# Patient Record
Sex: MALE | Race: WHITE | NOT HISPANIC OR LATINO | Employment: FULL TIME | URBAN - METROPOLITAN AREA
[De-identification: names, ages, dates, MRNs, and addresses within clinical notes are randomized per-mention and may not be internally consistent; named-entity substitution may affect disease eponyms.]

---

## 2023-05-04 DIAGNOSIS — I10 BENIGN ESSENTIAL HYPERTENSION: Primary | ICD-10-CM

## 2023-05-04 RX ORDER — AMLODIPINE BESYLATE 5 MG/1
5 TABLET ORAL DAILY
COMMUNITY
End: 2023-05-04 | Stop reason: SDUPTHER

## 2023-05-04 RX ORDER — AMLODIPINE BESYLATE 5 MG/1
5 TABLET ORAL DAILY
Qty: 90 TABLET | Refills: 0 | Status: SHIPPED | OUTPATIENT
Start: 2023-05-04 | End: 2023-08-09 | Stop reason: SDUPTHER

## 2023-07-03 DIAGNOSIS — I10 BENIGN ESSENTIAL HYPERTENSION: Primary | ICD-10-CM

## 2023-07-03 RX ORDER — LOSARTAN POTASSIUM AND HYDROCHLOROTHIAZIDE 12.5; 5 MG/1; MG/1
2 TABLET ORAL DAILY
COMMUNITY
Start: 2020-06-08 | End: 2023-07-03 | Stop reason: SDUPTHER

## 2023-07-04 RX ORDER — LOSARTAN POTASSIUM AND HYDROCHLOROTHIAZIDE 12.5; 5 MG/1; MG/1
2 TABLET ORAL DAILY
Qty: 180 TABLET | Refills: 0 | Status: SHIPPED | OUTPATIENT
Start: 2023-07-04 | End: 2023-07-04

## 2023-08-09 DIAGNOSIS — I10 BENIGN ESSENTIAL HYPERTENSION: ICD-10-CM

## 2023-08-11 RX ORDER — AMLODIPINE BESYLATE 5 MG/1
5 TABLET ORAL DAILY
Qty: 90 TABLET | Refills: 0 | Status: SHIPPED | OUTPATIENT
Start: 2023-08-11 | End: 2023-08-11

## 2023-10-07 DIAGNOSIS — I10 BENIGN ESSENTIAL HYPERTENSION: ICD-10-CM

## 2023-10-10 RX ORDER — LOSARTAN POTASSIUM AND HYDROCHLOROTHIAZIDE 12.5; 5 MG/1; MG/1
2 TABLET ORAL DAILY
Qty: 180 TABLET | Refills: 0 | Status: SHIPPED | OUTPATIENT
Start: 2023-10-10 | End: 2024-01-10 | Stop reason: SDUPTHER

## 2023-10-11 ENCOUNTER — PHARMACY VISIT (OUTPATIENT)
Dept: PHARMACY | Facility: CLINIC | Age: 53
End: 2023-10-11
Payer: COMMERCIAL

## 2023-10-11 PROCEDURE — RXMED WILLOW AMBULATORY MEDICATION CHARGE

## 2023-10-13 DIAGNOSIS — I10 BENIGN ESSENTIAL HYPERTENSION: ICD-10-CM

## 2023-10-17 ENCOUNTER — PHARMACY VISIT (OUTPATIENT)
Dept: PHARMACY | Facility: CLINIC | Age: 53
End: 2023-10-17
Payer: COMMERCIAL

## 2023-10-17 RX ORDER — METHYLPHENIDATE HYDROCHLORIDE 18 MG/1
36 TABLET ORAL DAILY
Qty: 180 TABLET | Refills: 0 | OUTPATIENT
Start: 2023-10-17 | End: 2023-10-19

## 2023-10-18 ENCOUNTER — PHARMACY VISIT (OUTPATIENT)
Dept: PHARMACY | Facility: CLINIC | Age: 53
End: 2023-10-18
Payer: COMMERCIAL

## 2023-10-18 PROCEDURE — RXMED WILLOW AMBULATORY MEDICATION CHARGE

## 2023-10-18 RX ORDER — AMLODIPINE BESYLATE 5 MG/1
5 TABLET ORAL DAILY
Qty: 90 TABLET | Refills: 0 | Status: SHIPPED | OUTPATIENT
Start: 2023-10-18 | End: 2024-01-10 | Stop reason: SDUPTHER

## 2023-10-18 RX ORDER — ATOMOXETINE 10 MG/1
20 CAPSULE ORAL DAILY
Qty: 90 CAPSULE | Refills: 0 | OUTPATIENT
Start: 2023-10-18 | End: 2023-12-15 | Stop reason: SDUPTHER

## 2023-10-20 ENCOUNTER — PHARMACY VISIT (OUTPATIENT)
Dept: PHARMACY | Facility: CLINIC | Age: 53
End: 2023-10-20
Payer: COMMERCIAL

## 2023-10-20 PROCEDURE — RXMED WILLOW AMBULATORY MEDICATION CHARGE

## 2023-10-20 RX ORDER — METHYLPHENIDATE HYDROCHLORIDE 36 MG/1
36 TABLET ORAL DAILY
Qty: 60 TABLET | Refills: 0 | OUTPATIENT
Start: 2023-10-19

## 2023-12-15 PROCEDURE — RXMED WILLOW AMBULATORY MEDICATION CHARGE

## 2023-12-16 ENCOUNTER — PHARMACY VISIT (OUTPATIENT)
Dept: PHARMACY | Facility: CLINIC | Age: 53
End: 2023-12-16
Payer: COMMERCIAL

## 2024-01-10 DIAGNOSIS — I10 BENIGN ESSENTIAL HYPERTENSION: ICD-10-CM

## 2024-01-10 PROCEDURE — RXMED WILLOW AMBULATORY MEDICATION CHARGE

## 2024-01-11 PROCEDURE — RXMED WILLOW AMBULATORY MEDICATION CHARGE

## 2024-01-11 RX ORDER — LOSARTAN POTASSIUM AND HYDROCHLOROTHIAZIDE 12.5; 5 MG/1; MG/1
2 TABLET ORAL DAILY
Qty: 180 TABLET | Refills: 0 | Status: SHIPPED | OUTPATIENT
Start: 2024-01-11 | End: 2024-03-23 | Stop reason: SDUPTHER

## 2024-01-11 RX ORDER — AMLODIPINE BESYLATE 5 MG/1
5 TABLET ORAL DAILY
Qty: 90 TABLET | Refills: 0 | Status: SHIPPED | OUTPATIENT
Start: 2024-01-11 | End: 2024-03-23 | Stop reason: SDUPTHER

## 2024-01-13 ENCOUNTER — PHARMACY VISIT (OUTPATIENT)
Dept: PHARMACY | Facility: CLINIC | Age: 54
End: 2024-01-13
Payer: COMMERCIAL

## 2024-01-24 DIAGNOSIS — Z00.00 HEALTH CARE MAINTENANCE: ICD-10-CM

## 2024-01-25 RX ORDER — MONTELUKAST SODIUM 10 MG/1
10 TABLET ORAL NIGHTLY
Qty: 90 TABLET | Refills: 0 | Status: SHIPPED | OUTPATIENT
Start: 2024-01-25 | End: 2024-04-25

## 2024-01-31 ENCOUNTER — SPECIALTY PHARMACY (OUTPATIENT)
Dept: PHARMACY | Facility: CLINIC | Age: 54
End: 2024-01-31

## 2024-02-15 PROCEDURE — RXMED WILLOW AMBULATORY MEDICATION CHARGE

## 2024-02-16 ENCOUNTER — PHARMACY VISIT (OUTPATIENT)
Dept: PHARMACY | Facility: CLINIC | Age: 54
End: 2024-02-16
Payer: COMMERCIAL

## 2024-02-27 ENCOUNTER — OFFICE VISIT (OUTPATIENT)
Dept: SLEEP MEDICINE | Facility: HOSPITAL | Age: 54
End: 2024-02-27
Payer: COMMERCIAL

## 2024-02-27 VITALS
TEMPERATURE: 97.7 F | BODY MASS INDEX: 45.03 KG/M2 | DIASTOLIC BLOOD PRESSURE: 89 MMHG | WEIGHT: 315 LBS | HEART RATE: 80 BPM | OXYGEN SATURATION: 93 % | SYSTOLIC BLOOD PRESSURE: 129 MMHG

## 2024-02-27 DIAGNOSIS — E66.01 CLASS 3 SEVERE OBESITY DUE TO EXCESS CALORIES WITH SERIOUS COMORBIDITY AND BODY MASS INDEX (BMI) OF 40.0 TO 44.9 IN ADULT (MULTI): ICD-10-CM

## 2024-02-27 DIAGNOSIS — G47.33 OSA (OBSTRUCTIVE SLEEP APNEA): Primary | ICD-10-CM

## 2024-02-27 PROCEDURE — 99214 OFFICE O/P EST MOD 30 MIN: CPT | Performed by: INTERNAL MEDICINE

## 2024-02-27 PROCEDURE — 1036F TOBACCO NON-USER: CPT | Performed by: INTERNAL MEDICINE

## 2024-02-27 SDOH — ECONOMIC STABILITY: FOOD INSECURITY: WITHIN THE PAST 12 MONTHS, THE FOOD YOU BOUGHT JUST DIDN'T LAST AND YOU DIDN'T HAVE MONEY TO GET MORE.: NEVER TRUE

## 2024-02-27 SDOH — ECONOMIC STABILITY: FOOD INSECURITY: WITHIN THE PAST 12 MONTHS, YOU WORRIED THAT YOUR FOOD WOULD RUN OUT BEFORE YOU GOT MONEY TO BUY MORE.: NEVER TRUE

## 2024-02-27 ASSESSMENT — LIFESTYLE VARIABLES: HOW OFTEN DO YOU HAVE A DRINK CONTAINING ALCOHOL: MONTHLY OR LESS

## 2024-02-27 ASSESSMENT — PATIENT HEALTH QUESTIONNAIRE - PHQ9
2. FEELING DOWN, DEPRESSED OR HOPELESS: NOT AT ALL
1. LITTLE INTEREST OR PLEASURE IN DOING THINGS: NOT AT ALL
SUM OF ALL RESPONSES TO PHQ9 QUESTIONS 1 & 2: 0

## 2024-02-27 ASSESSMENT — PAIN SCALES - GENERAL: PAINLEVEL: 0-NO PAIN

## 2024-02-27 NOTE — PROGRESS NOTES
Patient: Tevin Costello    [id number]  : 1970 -- AGE 53 y.o.    Provider: Abdiel Butler MD PhD     Location [facility]   Service Date: 2024              [facility]  Followup Visit Note      HISTORY OF PRESENT ILLNESS     The patient's referring provider is: No ref. provider found    HISTORY OF PRESENT ILLNESS   Tevin Costello is a 53 y.o. male with past medical history significant for ANABEL, HTN, HLD, ADHD, glucose intolerante, hx of COVID in 2021 with hospitalization who presents to a [facility] for followup.       PAST SLEEP HISTORY  He had a sleep study on 2018 which showed an AHI of 50 events/hour with an SPO2 odin of 68%. He had a CPAP titration on 2018 at a BMI of 40 which showed a residual AHI of 1.8 events/hour auto CPAP of 13 but with elevated PLM index of 38 events/hour. He has been on APAP 8-14. He had a pulse ox study on CPAP in 3/2021 with no significant residual hypoxemia. He is using oxygen at 4lpm with his APAP. He has been a good user of CPAP his DME is [employer]. He has been using a full facemask with a CPAP device. He has a Dreamstation since 2018, but now is on a Resmed AirSense 11.  He uses magneisum, calcium, vit D3 and sleeps 6-8 per night.      He had a nasal surgery in the past. He is on Concerta 36 for ADD but also helps him stay awake.     CURRENT HISTORY  At time of his last visit we ordered a new PAP device . We stopped his oxygen as he no longer needed it.    On today's visit, the patient reports he is using his CPAP nightly. On CPAP he does not snore, gasp, choke. He continues to use a magnesium which helps his sleep. He has transitioned to Strattera for his ADD. Concerta was tending to keep him more awake at end of day.    Sleep schedule:  In bed:1AM (sometimes 2AM)  Activities in bed: no  Lights out: < 15 min  Subjective sleep latency:  no  Awakenings during night: wake  Length of awakenings: 15minutes  Final awakening time: 9AM  Out of bed: 9AM  Overall estimate of total sleep time:      On weekends: similar    Preferred sleeping position: supine and sidelying  Typically sleeps with his wife.       PAP Adherence:  DURABLE MEDICAL EQUIPMENT COMPANY: [employer]  Machine: THERAPY: RESMED AIRSENSE 11 PRESSURE SETTINGS: 5 - 15 cm H2O  Mask: MASK TYPE: FULL FACE MASK - F&P Simplus - medium.  Issues with therapy: ISSUES WITH THERAPY: none  Benefits with PAP: PERCEIVED BENEFITS OF PAP: refreshing sleep reduced daytime sleepiness    Daytime Symptoms  On awakening patient reports: waking refreshed    Daytime: During the day, he does not doze unintentionally while inactive.  During more active tasks, he rarely is drowsy.  With regards to daytime napping, the patient reports rarely taking naps. If he takes a nap, typically he awakens refreshed.  He does not report that poor sleep results in mood-related irritability. He does not report that poor sleep results in issues with memory/concentration.    ESS: 0  QING: 1  FOSQ: 40      REVIEW OF SYSTEMS     REVIEW OF SYSTEMS  Review of Systems   All other systems reviewed and are negative.      ALLERGIES AND MEDICATIONS     ALLERGIES  Allergies   Allergen Reactions    Aspirin Swelling    Penicillin Swelling       MEDICATIONS: He has a current medication list which includes the following prescription(s): amlodipine - TAKE 1 TABLET BY MOUTH ONCE DAILY, atomoxetine - Take 2 capsules by mouth daily, dupixent pen - 1(one) application(s) subcutaneous 2(two) times a, dupixent pen - 1(one) application(s) subcutaneous 2(two) times a, dupixent pen - 1(one) application(s) subcutaneous 2(two) times a, losartan-hydrochlorothiazide - TAKE 2 TABLETS BY MOUTH ONCE DAILY, montelukast - TAKE 1 TABLET BY MOUTH EVERYDAY AT BEDTIME, methylphenidate er - Take 1 tablet by mouth daily (Patient not taking: Reported on  2/27/2024), and methylphenidate er - TAKE 2 TABLETS BY MOUTH ONCE DAILY.    PAST MEDICAL HISTORY : He does not have a problem list on file.    PAST SURGICAL HISTORY: He  has a past surgical history that includes Tonsillectomy (03/15/2016) and Tonsillectomy (09/15/2017).     FAMILY HISTORY: No changes since previous visit. Otherwise non-contributory as charted.     SOCIAL HISTORY  He  reports that he has never smoked. He has never used smokeless tobacco. He reports current alcohol use. He reports that he does not use drugs. .      PHYSICAL EXAM     Physical Examination: /89 (BP Location: Right arm, Patient Position: Sitting)   Pulse 80   Temp 36.5 °C (97.7 °F) (Temporal)   Wt (!) 151 kg (332 lb)   SpO2 93% Comment: RA  BMI 45.03 kg/m²     PREVIOUS WEIGHTS:  Wt Readings from Last 3 Encounters:   02/27/24 (!) 151 kg (332 lb)   01/25/23 (!) 153 kg (336 lb 8 oz)   10/27/22 (!) 152 kg (335 lb 8 oz)       General: The patient is a pleasant male, in no acute distress. HEENT: He has a  a modified Mallampati grade 3 airway with Numbers; 0-4 (with +): No tonsils bilaterally. The soft palate was normal and the uvula was thin short. The A/P diameter of the velopharynx was not narrowed. The oropharynx was not shallow in the A/P diameter. Lateral wall narrowing was not present. Tongue ridging waspresent. Erythema of the posterior pharynx was not present. Mucosal hypertrophy in the posterior oropharynx was not present. Retrognathia was not and micrognathia was not present. Neck: The neck was not enlarged. No JVP, bruits or lymphadenopathy was appreciated. Chest: Clear to auscultation. No wheezes, rales, or rhonchi. Cardiovascular: Regular rate and rhythm. No murmurs, gallops, or clicks. Abdomen: Soft, nontender, nondistended. Positive bowel sounds. Extremities: No clubbing, cyanosis, or edema is noted. Neurologic exam: Alert, oriented x3 and was grossly non-focal.      RESULTS/DATA     Ferritin (ug/L)  "  Date Value   10/25/2018 216       Bicarbonate (mmol/L)   Date Value   01/28/2022 29   01/15/2021 26   01/14/2021 26       PAP Adherence  A PAP adherence download was obtained and data was reviewed personally today in clinic. (see scanned document in EPIC)      DIAGNOSES     Problem List and Orders  Diagnoses and all orders for this visit:  ANABEL (obstructive sleep apnea)  -     Positive Airway Pressure (PAP) Therapy  -     Follow Up In Adult Sleep Medicine; Future        ASSESSMENT/PLAN     Mr. Costello is a 53 y.o. male and with past medical history significant for ANABEL, HTN, HLD, ADHD, glucose intolerante, hx of COVID in January 2021 with hospitalization. He returns in followup to  the [facility] for their ANABEL.    ANABEL on PAP: Overall, Mr. Costello is doing well with the use of PAP therapy for his ANABEL. Compared to before starting PAP, he continues to use and benefit from use of the PAP device and has observed improvements in sleep quality and daytime function. Specifically, his residual daytime sleepiness or fatigue has significantly improved. Thus, continued use of PAP was recommended and to use for the entire sleep period. On PAP, he doesn't snore, have witnessed apneas, gasp for air, or kick/thrash. Objective compliance data also suggest excellent efficacy in the home setting. At a minimum he should use PAP for at least 4 hours/night, however, \"all night, every night\" is best. In addition, PAP use during naps are also encouraged. Mr. Costello was advised to contact him DME to obtain replenishment supplies for him PAP every 3 months or as needed or for other equipment specific issues.    Obesity.  The patient was counseled that his weight is the strongest modifiable risk factor and contributor for ANABEL. He was counseled to consider weight loss options to include changes in dietary habits and activity. We briefly discussed the use of calorie tracking smartphone apps and the use of " activity meters to provide feedback that helps in losing weight.  Before initiating an exercise plan, he should carefully review the approach with his primary care provider.     ADD. Is not on Straterra and weaning the Concerta with goal to stop it. This seems to help his alterness as well.    Follow up: 12 months         Abdiel Butler MD PhD

## 2024-02-27 NOTE — PATIENT INSTRUCTIONS
[facility]  [facility] [place]  [address]  [facility] [address]  [phone]    [facility] [place]  [address]  [facility] [address]  [phone]  [facility] [place]  [address]  [facility] 6TH FLOOR  [facility] [address]           NAME: Tevin Costello   DATE: 2/27/2024     Your Sleep Provider Today: Abdiel Butler MD PhD  Your Primary Care Physician: Morales Clinton MD   Your Referring Provider: No ref. provider found    Thank you for coming to the Sleep Medicine Clinic today! Your sleep medicine provider today was: Abdiel Butler MD PhD Below is a summary of your treatment plan, other important information, and our contact numbers:  If you need to schedule an appointment, please call [phone])  If you need general assistance (e.g. forms completed, general questions), please call my , Jen, at [phone].  If you have a medical question about your sleep issues, please contact our nurses, Jacki or Jerri at [phone].   You can also contact us through [employer].      DIAGNOSIS:   No diagnosis found.        TREATMENT PLAN       Instructions - Common ANABEL Recs: - For your sleep apnea, continue to use your PAP every night and use it whenever you are sleeping.   - Avoid alcohol or sedatives several hours prior to sleeping.   - Get additional supplies for your PAP (e.g., mask, hose, filters) every 3 months or as your insurance allows from your [employer] company. Replacement cushions for your PAP mask can be requested monthly if airseals are an issue.  - Remember to clean your mask, tubings, and water chamber regularly as instructed.  - Avoid driving or operating heavy machinery when drowsy. A person driving while sleepy is five (5) times more likely to have an accident. If you feel sleepy, pull over and take a short power nap (sleep  for less than 30 minutes). Otherwise, ask somebody to drive you.      Follow-up Appointment:   Followup with me in 12 months.      IMPORTANT INFORMATION     Call 911 for medical emergencies.  Our offices are generally open from Monday-Friday, 9 am - 5 pm.  If you need to get in touch with me, you may either call me and my team(number is below) or you can use [employer].  If a referral for a test, for CPAP, or for another specialist was made, and you have not heard about scheduling this within a week, please call scheduling at [phone]).  If you are unable to make your appointment for clinic or an overnight study, kindly call the office at least 48 hours in advance to cancel and reschedule.  If you are on CPAP, please bring your device's card or the device to each clinic appointment.   There are no supporting services by either the sleep doctors or their staff on weekends and Holidays, or after 5 PM on weekdays.   If you have been asked to come to a sleep study, make sure you bring toiletries, a comfy pillow, and any nighttime medications that you may regularly take. Also be sure to eat dinner before you arrive. We generally do not provide meals.      PRESCRIPTIONS     We require 7 days advanced notice for prescription refills. If we do not receive the request in this time, we cannot guarantee that your medication will be refilled in time.      IMPORTANT PHONE NUMBERS      scheduling for medical testin656 [phone]   Sleep Medicine Clinic Fax: [phone]  Appointments (for Pediatric Sleep Clinic): [phone] - option 1  Appointments (for Adult Sleep Clinic): [phone] - option 2  Appointments (For Sleep Studies): [phone] - option 3  Behavioral Sleep Medicine: [phone]  Bariatric Surgery: [phone] ( Bariatric Surgery Website)   Sleep Surgery: [phone]  ENT (Otolaryngology): [phone]  Headache Clinic (Neurology): [phone]  Neurology:  [phone]  Psychiatry: [phone]  Pulmonary Function Testing (PFT) Center: [phone]  Pulmonary Medicine: [phone]  [employer] (DME): [phone]  [employer] (DME): [phone]  [facility] (DME): [phone]      COMMON PROVIDERS WE REFER TO     For Weight Loss - Dr. Shari Cam - Call [phone]  For Sleep Surgery - Dr. Wilian Jones - Call [phone]      OUR ADULT SLEEP MEDICINE TEAM   Please do not hesitate to call the office or sleep nurse with any questions between appointments:    Adult Sleep Nurses (Jerri Garibay, RN and Jacki Briggs RN):  For clinical questions and refilling prescriptions: [phone]  Email sleep diaries and other documents at: [email or web address]    Adult Sleep Medicine Secretaries:  Zina Araiza (For Calixto/Morris/Krbakari/Strohl/Yeisaac/Umaña):   P: [phone]  F: [phone]  Jen Benedict (For Butler/Guggenbiller): P: [phone]  Fax: [phone]  Roseline Islas (For Hector/Blank): P: [phone]  F: [phone]  Marizol Hernandes (For Narayan): P: [phone]  F: [phone]  Kavitha Hendreson (For Michelle/Ish/Zakhary): P: [phone]  F: [phone]  Abiola Bean (For Skyler/Deni): P: [phone]  F: [phone]     Adult Sleep Medicine Advanced Practice Providers:  Benjamin Parsons ([place], [place])  Kayleen Deng ([facility])  Marcia Lentz CNP ([place], [place], [place])  Lily Alvarez CNP ([place], [place], [place])  Paris Busch ([place], [place], [place])  Alicja Cheema CNP ([place])        OUR SLEEP TESTING LOCATIONS     Our team will contact you to schedule your sleep study, however, you can contact us as follow:  Main Phone Line (scheduling only): [phone]), option 3  Adult and Pediatric Locations  [facility] (6 years and older): [facility] - 4th floor ([facility]) After hours  "line: [phone]  [facility] (Main campus: All ages): [facility], 6th floor. After hours line: [phone]   [place] (5 years and older; younger considered on case-by-case basis): [address]; [address], [address]. Scheduling  After hours line: [phone]   [place] (6 years and older): [address]; [address]; [address]   [place] (6 years and older): [address] [facility], [address]  After hours line: [phone]   [place] (13 years and older) in [place]: [address], 2nd floor  After hours line: [phone]   [place] (13 year and older): [address]  After hours line: [phone]     Adult Only Locations:   [place] (18 years and older): [address] [place] [address], 2nd floor   [place] (18 years and older): [address] [facility]; 4th floor  After hours line: [phone]   [place] (18 years and older) at [place]: [id number] [place] [address]  After hours line: [phone]          CONTACTING YOUR SLEEP MEDICINE PROVIDER     Send a message directly to your provider through \"My Chart\", which is the email service through your  Records Account: [email or web address]   Call [phone] and leave a message. One of the administrative assistants will forward the message to your sleep medicine provider through \"My Chart\" and/or email.     Your sleep medicine provider for this visit was: Abdiel Butler MD PhD        "

## 2024-03-01 PROCEDURE — RXMED WILLOW AMBULATORY MEDICATION CHARGE

## 2024-03-12 ENCOUNTER — SPECIALTY PHARMACY (OUTPATIENT)
Dept: PHARMACY | Facility: CLINIC | Age: 54
End: 2024-03-12

## 2024-03-13 ENCOUNTER — PHARMACY VISIT (OUTPATIENT)
Dept: PHARMACY | Facility: CLINIC | Age: 54
End: 2024-03-13
Payer: COMMERCIAL

## 2024-03-20 ENCOUNTER — OFFICE VISIT (OUTPATIENT)
Dept: PRIMARY CARE | Facility: CLINIC | Age: 54
End: 2024-03-20
Payer: COMMERCIAL

## 2024-03-20 VITALS — WEIGHT: 315 LBS | DIASTOLIC BLOOD PRESSURE: 81 MMHG | BODY MASS INDEX: 43.94 KG/M2 | SYSTOLIC BLOOD PRESSURE: 141 MMHG

## 2024-03-20 DIAGNOSIS — R73.02 GLUCOSE INTOLERANCE (IMPAIRED GLUCOSE TOLERANCE): ICD-10-CM

## 2024-03-20 DIAGNOSIS — E78.2 MIXED HYPERLIPIDEMIA: ICD-10-CM

## 2024-03-20 DIAGNOSIS — G47.33 OSA ON CPAP: ICD-10-CM

## 2024-03-20 DIAGNOSIS — Z12.11 ENCOUNTER FOR SCREENING FOR MALIGNANT NEOPLASM OF COLON: ICD-10-CM

## 2024-03-20 DIAGNOSIS — Z00.00 HEALTH CARE MAINTENANCE: Primary | ICD-10-CM

## 2024-03-20 DIAGNOSIS — I10 PRIMARY HYPERTENSION: ICD-10-CM

## 2024-03-20 PROCEDURE — 3077F SYST BP >= 140 MM HG: CPT | Performed by: INTERNAL MEDICINE

## 2024-03-20 PROCEDURE — 1036F TOBACCO NON-USER: CPT | Performed by: INTERNAL MEDICINE

## 2024-03-20 PROCEDURE — 99214 OFFICE O/P EST MOD 30 MIN: CPT | Performed by: INTERNAL MEDICINE

## 2024-03-20 PROCEDURE — 3079F DIAST BP 80-89 MM HG: CPT | Performed by: INTERNAL MEDICINE

## 2024-03-20 ASSESSMENT — ENCOUNTER SYMPTOMS
SHORTNESS OF BREATH: 0
HYPERTENSION: 1
HEADACHES: 0
SWEATS: 0
ORTHOPNEA: 0
BLURRED VISION: 0
PND: 0
NECK PAIN: 0
PALPITATIONS: 0

## 2024-03-20 NOTE — PROGRESS NOTES
Subjective   Patient ID: Tevin Costello is a 53 y.o. male who presents for No chief complaint on file..    HPI Follow-up visit no chest pain no shortness of breath no side effect with medication have not seen for some time 2 months ago went on a health-related diet a decreasing starches and trying to exercise more for his ADHD he has switched from Concerta to Strattera try to decrease his coffee intake and try to increase his exercise bowels have been okay no dysuria    Review of Systems    Objective   /81   Wt 147 kg (324 lb)   BMI 43.94 kg/m²     Physical Exam vital signs noted alert and oriented x 3 NCAT no JVD or bruit no lid lag no thyromegaly chest clear to auscultation CV regular rate and rhythm S1-S2 without murmur gallop or rub abdomen morbidly obese soft nontender normal active bowel sounds LS spine normal curvature negative straight leg raise extremities no clubbing cyanosis trace edema intact distal pulses DTR 1+    Assessment/Plan impression ADHD hypertension hyperlipidemia obesity ANABEL on CPAP glucose intolerance  Plan follow-up with psychiatrist for his medication good diet regular exercise increase water consumption further weight loss check Chem-7 advised on glucose potassium and kidney function check hepatic panel lipid panel advised on liver enzymes and cholesterol and cholesterol medicine check CBC advised on blood count continue to follow-up with sleep medicine and on CPAP regarding sleep apnea sleep hygiene check PSA advised on prostate okay for colonoscopy requisition made check TSH advised on thyroid and metabolism glucose intolerance check A1c advised on long-term blood sugar test continue with blood pressure medications for now we will review all including losartan hydrochlorothiazide amlodipine and recheck more regularly based on above TT 50 cc 26

## 2024-03-23 DIAGNOSIS — I10 BENIGN ESSENTIAL HYPERTENSION: ICD-10-CM

## 2024-03-25 RX ORDER — LOSARTAN POTASSIUM AND HYDROCHLOROTHIAZIDE 12.5; 5 MG/1; MG/1
2 TABLET ORAL DAILY
Qty: 180 TABLET | Refills: 0 | Status: SHIPPED | OUTPATIENT
Start: 2024-03-25 | End: 2025-03-25

## 2024-03-25 RX ORDER — AMLODIPINE BESYLATE 5 MG/1
5 TABLET ORAL DAILY
Qty: 90 TABLET | Refills: 0 | Status: SHIPPED | OUTPATIENT
Start: 2024-03-25 | End: 2025-03-25

## 2024-03-26 PROCEDURE — RXMED WILLOW AMBULATORY MEDICATION CHARGE

## 2024-03-29 ENCOUNTER — PHARMACY VISIT (OUTPATIENT)
Dept: PHARMACY | Facility: CLINIC | Age: 54
End: 2024-03-29
Payer: COMMERCIAL

## 2024-03-29 PROCEDURE — RXMED WILLOW AMBULATORY MEDICATION CHARGE

## 2024-03-29 RX ORDER — ATOMOXETINE 25 MG/1
25 CAPSULE ORAL DAILY
Qty: 90 CAPSULE | Refills: 0 | OUTPATIENT
Start: 2024-03-29

## 2024-03-29 RX ORDER — ATOMOXETINE 25 MG/1
25 CAPSULE ORAL DAILY
Qty: 90 CAPSULE | Refills: 1 | OUTPATIENT
Start: 2024-03-29

## 2024-04-01 ENCOUNTER — SPECIALTY PHARMACY (OUTPATIENT)
Dept: PHARMACY | Facility: CLINIC | Age: 54
End: 2024-04-01

## 2024-04-01 ENCOUNTER — PHARMACY VISIT (OUTPATIENT)
Dept: PHARMACY | Facility: CLINIC | Age: 54
End: 2024-04-01
Payer: COMMERCIAL

## 2024-04-08 ENCOUNTER — PHARMACY VISIT (OUTPATIENT)
Dept: PHARMACY | Facility: CLINIC | Age: 54
End: 2024-04-08
Payer: COMMERCIAL

## 2024-04-19 DIAGNOSIS — Z12.11 COLON CANCER SCREENING: ICD-10-CM

## 2024-04-19 PROCEDURE — RXMED WILLOW AMBULATORY MEDICATION CHARGE

## 2024-04-19 RX ORDER — POLYETHYLENE GLYCOL 3350, SODIUM CHLORIDE, SODIUM BICARBONATE, POTASSIUM CHLORIDE 420; 11.2; 5.72; 1.48 G/4L; G/4L; G/4L; G/4L
POWDER, FOR SOLUTION ORAL
Qty: 4000 ML | Refills: 0 | Status: SHIPPED | OUTPATIENT
Start: 2024-04-19

## 2024-04-25 DIAGNOSIS — Z00.00 HEALTH CARE MAINTENANCE: ICD-10-CM

## 2024-04-25 RX ORDER — MONTELUKAST SODIUM 10 MG/1
10 TABLET ORAL NIGHTLY
Qty: 90 TABLET | Refills: 0 | Status: SHIPPED | OUTPATIENT
Start: 2024-04-25

## 2024-04-27 ENCOUNTER — PHARMACY VISIT (OUTPATIENT)
Dept: PHARMACY | Facility: CLINIC | Age: 54
End: 2024-04-27
Payer: COMMERCIAL

## 2024-04-29 PROCEDURE — RXMED WILLOW AMBULATORY MEDICATION CHARGE

## 2024-05-02 ENCOUNTER — SPECIALTY PHARMACY (OUTPATIENT)
Dept: PHARMACY | Facility: CLINIC | Age: 54
End: 2024-05-02

## 2024-05-03 ENCOUNTER — PHARMACY VISIT (OUTPATIENT)
Dept: PHARMACY | Facility: CLINIC | Age: 54
End: 2024-05-03
Payer: COMMERCIAL

## 2024-05-07 ENCOUNTER — LAB (OUTPATIENT)
Dept: LAB | Facility: LAB | Age: 54
End: 2024-05-07
Payer: COMMERCIAL

## 2024-05-07 DIAGNOSIS — E78.2 MIXED HYPERLIPIDEMIA: ICD-10-CM

## 2024-05-07 DIAGNOSIS — R73.02 GLUCOSE INTOLERANCE (IMPAIRED GLUCOSE TOLERANCE): ICD-10-CM

## 2024-05-07 DIAGNOSIS — I10 PRIMARY HYPERTENSION: ICD-10-CM

## 2024-05-07 DIAGNOSIS — G47.33 OSA ON CPAP: ICD-10-CM

## 2024-05-07 DIAGNOSIS — Z00.00 HEALTH CARE MAINTENANCE: ICD-10-CM

## 2024-05-07 LAB
ALBUMIN SERPL BCP-MCNC: 4.2 G/DL (ref 3.4–5)
ALP SERPL-CCNC: 81 U/L (ref 33–120)
ALT SERPL W P-5'-P-CCNC: 48 U/L (ref 10–52)
ANION GAP SERPL CALC-SCNC: 10 MMOL/L (ref 10–20)
AST SERPL W P-5'-P-CCNC: 21 U/L (ref 9–39)
BILIRUB SERPL-MCNC: 0.4 MG/DL (ref 0–1.2)
BUN SERPL-MCNC: 21 MG/DL (ref 6–23)
CALCIUM SERPL-MCNC: 9.1 MG/DL (ref 8.6–10.3)
CHLORIDE SERPL-SCNC: 105 MMOL/L (ref 98–107)
CHOLEST SERPL-MCNC: 271 MG/DL (ref 0–199)
CHOLESTEROL/HDL RATIO: 7.7
CO2 SERPL-SCNC: 30 MMOL/L (ref 21–32)
COTININE UR QL SCN: NEGATIVE
CREAT SERPL-MCNC: 0.86 MG/DL (ref 0.5–1.3)
EGFRCR SERPLBLD CKD-EPI 2021: >90 ML/MIN/1.73M*2
ERYTHROCYTE [DISTWIDTH] IN BLOOD BY AUTOMATED COUNT: 13.7 % (ref 11.5–14.5)
EST. AVERAGE GLUCOSE BLD GHB EST-MCNC: 128 MG/DL
GLUCOSE SERPL-MCNC: 133 MG/DL (ref 74–99)
HBA1C MFR BLD: 6.1 %
HCT VFR BLD AUTO: 42 % (ref 41–52)
HDLC SERPL-MCNC: 35.4 MG/DL
HGB BLD-MCNC: 14.2 G/DL (ref 13.5–17.5)
LDLC SERPL CALC-MCNC: 185 MG/DL
MCH RBC QN AUTO: 28.2 PG (ref 26–34)
MCHC RBC AUTO-ENTMCNC: 33.8 G/DL (ref 32–36)
MCV RBC AUTO: 84 FL (ref 80–100)
NON HDL CHOLESTEROL: 236 MG/DL (ref 0–149)
NRBC BLD-RTO: 0 /100 WBCS (ref 0–0)
PLATELET # BLD AUTO: 377 X10*3/UL (ref 150–450)
POTASSIUM SERPL-SCNC: 4.2 MMOL/L (ref 3.5–5.3)
PROT SERPL-MCNC: 7.1 G/DL (ref 6.4–8.2)
PSA SERPL-MCNC: 0.71 NG/ML
RBC # BLD AUTO: 5.03 X10*6/UL (ref 4.5–5.9)
SODIUM SERPL-SCNC: 141 MMOL/L (ref 136–145)
TRIGL SERPL-MCNC: 254 MG/DL (ref 0–149)
TSH SERPL-ACNC: 2.14 MIU/L (ref 0.44–3.98)
VLDL: 51 MG/DL (ref 0–40)
WBC # BLD AUTO: 9.3 X10*3/UL (ref 4.4–11.3)

## 2024-05-07 PROCEDURE — 84153 ASSAY OF PSA TOTAL: CPT

## 2024-05-07 PROCEDURE — 85027 COMPLETE CBC AUTOMATED: CPT

## 2024-05-07 PROCEDURE — 84443 ASSAY THYROID STIM HORMONE: CPT

## 2024-05-07 PROCEDURE — 83036 HEMOGLOBIN GLYCOSYLATED A1C: CPT

## 2024-05-07 PROCEDURE — 80061 LIPID PANEL: CPT

## 2024-05-07 PROCEDURE — 80053 COMPREHEN METABOLIC PANEL: CPT

## 2024-05-07 PROCEDURE — 36415 COLL VENOUS BLD VENIPUNCTURE: CPT

## 2024-05-09 ENCOUNTER — OFFICE VISIT (OUTPATIENT)
Dept: GASTROENTEROLOGY | Facility: CLINIC | Age: 54
End: 2024-05-09
Payer: COMMERCIAL

## 2024-05-09 VITALS
SYSTOLIC BLOOD PRESSURE: 120 MMHG | DIASTOLIC BLOOD PRESSURE: 79 MMHG | HEART RATE: 74 BPM | WEIGHT: 315 LBS | TEMPERATURE: 98.1 F | BODY MASS INDEX: 45.03 KG/M2

## 2024-05-09 DIAGNOSIS — K60.2 ANAL FISSURE: Primary | ICD-10-CM

## 2024-05-09 PROBLEM — M54.50 CHRONIC LEFT-SIDED LOW BACK PAIN WITHOUT SCIATICA: Status: ACTIVE | Noted: 2024-05-09

## 2024-05-09 PROBLEM — Z98.84 HISTORY OF BARIATRIC SURGERY: Status: ACTIVE | Noted: 2024-05-09

## 2024-05-09 PROBLEM — E78.5 DYSLIPIDEMIA: Status: ACTIVE | Noted: 2024-05-09

## 2024-05-09 PROBLEM — E66.9 OBESITY: Status: ACTIVE | Noted: 2024-05-09

## 2024-05-09 PROBLEM — I10 BENIGN ESSENTIAL HYPERTENSION: Status: ACTIVE | Noted: 2024-05-09

## 2024-05-09 PROBLEM — G93.32 CHRONIC FATIGUE SYNDROME: Status: ACTIVE | Noted: 2024-05-09

## 2024-05-09 PROBLEM — G89.29 CHRONIC LEFT-SIDED LOW BACK PAIN WITHOUT SCIATICA: Status: ACTIVE | Noted: 2024-05-09

## 2024-05-09 PROBLEM — F90.2 ATTENTION DEFICIT HYPERACTIVITY DISORDER (ADHD), COMBINED TYPE: Status: ACTIVE | Noted: 2024-05-09

## 2024-05-09 PROBLEM — G47.33 OBSTRUCTIVE SLEEP APNEA SYNDROME: Status: ACTIVE | Noted: 2024-02-27

## 2024-05-09 PROCEDURE — 3074F SYST BP LT 130 MM HG: CPT | Performed by: NURSE PRACTITIONER

## 2024-05-09 PROCEDURE — 3078F DIAST BP <80 MM HG: CPT | Performed by: NURSE PRACTITIONER

## 2024-05-09 PROCEDURE — 99203 OFFICE O/P NEW LOW 30 MIN: CPT | Performed by: NURSE PRACTITIONER

## 2024-05-09 PROCEDURE — 99213 OFFICE O/P EST LOW 20 MIN: CPT | Performed by: NURSE PRACTITIONER

## 2024-05-09 PROCEDURE — 1036F TOBACCO NON-USER: CPT | Performed by: NURSE PRACTITIONER

## 2024-05-09 RX ORDER — POLYETHYLENE GLYCOL 3350 17 G/17G
17 POWDER, FOR SOLUTION ORAL DAILY PRN
Qty: 30 PACKET | Refills: 2 | Status: SHIPPED | OUTPATIENT
Start: 2024-05-09 | End: 2024-08-07

## 2024-05-09 RX ORDER — DOCUSATE SODIUM 100 MG/1
100 CAPSULE, LIQUID FILLED ORAL 2 TIMES DAILY
Qty: 60 CAPSULE | Refills: 0 | Status: SHIPPED | OUTPATIENT
Start: 2024-05-09

## 2024-05-09 ASSESSMENT — ENCOUNTER SYMPTOMS
PSYCHIATRIC NEGATIVE: 1
RESPIRATORY NEGATIVE: 1
ENDOCRINE NEGATIVE: 1
HEMATOLOGIC/LYMPHATIC NEGATIVE: 1
DEPRESSION: 0
CARDIOVASCULAR NEGATIVE: 1
DIFFICULTY URINATING: 0
ALLERGIC/IMMUNOLOGIC NEGATIVE: 1
DIAPHORESIS: 0
WHEEZING: 0
FEVER: 0
STRIDOR: 0
FATIGUE: 0
COUGH: 0
ROS GI COMMENTS: SEE HPI
APNEA: 0
MUSCULOSKELETAL NEGATIVE: 1
CHILLS: 0
NEUROLOGICAL NEGATIVE: 1
EYES NEGATIVE: 1
SHORTNESS OF BREATH: 0
CHEST TIGHTNESS: 0

## 2024-05-09 ASSESSMENT — PAIN SCALES - GENERAL: PAINLEVEL: 0-NO PAIN

## 2024-05-09 NOTE — PATIENT INSTRUCTIONS
Start colace daily  Start Nifedipine ointment  Start benefiber one teaspoon daily with a full glass of water  Start Miralax as needed     Use a sitz bath after having a Bowel movement    Follow up in 3-4 weeks if not better, seen Kala [facility] In Colorectal

## 2024-05-09 NOTE — PROGRESS NOTES
Subjective   Patient ID: Tevin Costello is a 53 y.o. male who presents for Rectal Pain and Constipation. PMH: HTN, on dupixent, ANABEL, HLD    Colonoscopy ordered. Scheduled 6/9  Used to take concerta for ADHD, was switched to strattera and this caused urination problems   Has a skin condition, on fingers and he was seen by dermatology and was started on dupixent  Is scheduled for psychology appointment at 930   Offered rectal examination but he deferred since he is waiting for his other appointment virtually    Thinks he has an anal fissure--he is still rectal pain, occurs with bowel movement  He felt like a tear, has small amount of bleeding only once or twice    He stopped both medication    He has a BM once a day, sometimes three or 4 times/day  He is not using anything for constipation  He is started on a laxative but only took it once    He has not had a colonoscopy   Family Hx: He denies a family hx of CRC, GI cancers      Review of Systems   Constitutional:  Negative for chills, diaphoresis, fatigue and fever.   HENT: Negative.     Eyes: Negative.    Respiratory: Negative.  Negative for apnea, cough, chest tightness, shortness of breath, wheezing and stridor.    Cardiovascular: Negative.    Gastrointestinal:         See HPI    Endocrine: Negative.    Genitourinary: Negative.  Negative for difficulty urinating.   Musculoskeletal: Negative.    Skin: Negative.    Allergic/Immunologic: Negative.    Neurological: Negative.    Hematological: Negative.    Psychiatric/Behavioral: Negative.         Objective   Physical Exam  Pulmonary:      Effort: Pulmonary effort is normal.   Musculoskeletal:         General: Normal range of motion.   Neurological:      Mental Status: He is alert.   Psychiatric:         Mood and Affect: Mood normal.         Assessment/Plan   Diagnoses and all orders for this visit:  Anal fissure  -     lidocaine HCl (bulk) 2 %, NIFEdipine (bulk) 0.2 percent in ointment base; Apply topically 2 times  a day.  -     docusate sodium (Colace) 100 mg capsule; Take 1 capsule (100 mg) by mouth 2 times a day.  -     polyethylene glycol (Miralax) 17 gram packet; Take 17 g by mouth once daily as needed (as needed for constipation).  -     Referral to Colorectal Surgery; Future    Presents today for rectal pain and constipation, unable to complete a rectal examination as he reports he has another virtual appointment scheduled and is currently in the waiting room. He is scheduled for a colonoscopy, will start anal fissure treatment and would recommend a follow-up for a rectal examination without improvement.        RAULITO Fitzgerald-CNP 05/09/24 9:24 AM

## 2024-06-13 PROCEDURE — RXMED WILLOW AMBULATORY MEDICATION CHARGE

## 2024-06-18 ENCOUNTER — APPOINTMENT (OUTPATIENT)
Dept: GASTROENTEROLOGY | Facility: EXTERNAL LOCATION | Age: 54
End: 2024-06-18
Payer: COMMERCIAL

## 2024-06-18 DIAGNOSIS — K57.30 DIVERTICULOSIS OF LARGE INTESTINE WITHOUT DIVERTICULITIS: ICD-10-CM

## 2024-06-18 DIAGNOSIS — D12.5 BENIGN NEOPLASM OF SIGMOID COLON: Primary | ICD-10-CM

## 2024-06-18 DIAGNOSIS — K62.89 OTHER SPECIFIED DISEASES OF ANUS AND RECTUM: ICD-10-CM

## 2024-06-18 DIAGNOSIS — Z12.11 ENCOUNTER FOR SCREENING FOR MALIGNANT NEOPLASM OF COLON: ICD-10-CM

## 2024-06-18 DIAGNOSIS — K64.8 OTHER HEMORRHOIDS: ICD-10-CM

## 2024-06-18 PROCEDURE — 88305 TISSUE EXAM BY PATHOLOGIST: CPT

## 2024-06-18 PROCEDURE — 45385 COLONOSCOPY W/LESION REMOVAL: CPT | Performed by: INTERNAL MEDICINE

## 2024-06-18 NOTE — PROGRESS NOTES
This patient had a procedure(s) done at the [facility].  Please see the procedure note for full details.     yes

## 2024-06-20 ENCOUNTER — PHARMACY VISIT (OUTPATIENT)
Dept: PHARMACY | Facility: CLINIC | Age: 54
End: 2024-06-20
Payer: COMMERCIAL

## 2024-06-21 ENCOUNTER — LAB REQUISITION (OUTPATIENT)
Dept: LAB | Facility: HOSPITAL | Age: 54
End: 2024-06-21
Payer: COMMERCIAL

## 2024-07-02 LAB
LABORATORY COMMENT REPORT: NORMAL
PATH REPORT.FINAL DX SPEC: NORMAL
PATH REPORT.GROSS SPEC: NORMAL
PATH REPORT.RELEVANT HX SPEC: NORMAL
PATH REPORT.TOTAL CANCER: NORMAL

## 2024-07-09 DIAGNOSIS — I10 BENIGN ESSENTIAL HYPERTENSION: ICD-10-CM

## 2024-07-09 RX ORDER — AMLODIPINE BESYLATE 5 MG/1
5 TABLET ORAL DAILY
Qty: 90 TABLET | Refills: 0 | Status: SHIPPED | OUTPATIENT
Start: 2024-07-09 | End: 2025-07-09

## 2024-07-09 RX ORDER — LOSARTAN POTASSIUM AND HYDROCHLOROTHIAZIDE 12.5; 5 MG/1; MG/1
2 TABLET ORAL DAILY
Qty: 180 TABLET | Refills: 0 | Status: SHIPPED | OUTPATIENT
Start: 2024-07-09 | End: 2025-07-09

## 2024-07-10 PROCEDURE — RXMED WILLOW AMBULATORY MEDICATION CHARGE

## 2024-07-14 ENCOUNTER — PHARMACY VISIT (OUTPATIENT)
Dept: PHARMACY | Facility: CLINIC | Age: 54
End: 2024-07-14
Payer: COMMERCIAL

## 2024-08-02 DIAGNOSIS — Z00.00 HEALTH CARE MAINTENANCE: ICD-10-CM

## 2024-08-03 RX ORDER — MONTELUKAST SODIUM 10 MG/1
10 TABLET ORAL NIGHTLY
Qty: 90 TABLET | Refills: 0 | Status: SHIPPED | OUTPATIENT
Start: 2024-08-03

## 2024-08-27 ENCOUNTER — HOSPITAL ENCOUNTER (EMERGENCY)
Facility: HOSPITAL | Age: 54
Discharge: HOME | End: 2024-08-27
Payer: COMMERCIAL

## 2024-08-27 ENCOUNTER — APPOINTMENT (OUTPATIENT)
Dept: RADIOLOGY | Facility: HOSPITAL | Age: 54
End: 2024-08-27
Payer: COMMERCIAL

## 2024-08-27 VITALS
BODY MASS INDEX: 41.75 KG/M2 | TEMPERATURE: 98 F | SYSTOLIC BLOOD PRESSURE: 155 MMHG | OXYGEN SATURATION: 96 % | RESPIRATION RATE: 18 BRPM | WEIGHT: 315 LBS | HEIGHT: 73 IN | HEART RATE: 84 BPM | DIASTOLIC BLOOD PRESSURE: 101 MMHG

## 2024-08-27 DIAGNOSIS — R73.9 HYPERGLYCEMIA: ICD-10-CM

## 2024-08-27 DIAGNOSIS — K62.89 RECTAL PAIN: Primary | ICD-10-CM

## 2024-08-27 DIAGNOSIS — R19.8 RECTAL TENESMUS: ICD-10-CM

## 2024-08-27 LAB
ALBUMIN SERPL BCP-MCNC: 4.2 G/DL (ref 3.4–5)
ALP SERPL-CCNC: 104 U/L (ref 33–120)
ALT SERPL W P-5'-P-CCNC: 52 U/L (ref 10–52)
ANION GAP SERPL CALC-SCNC: 15 MMOL/L (ref 10–20)
APPEARANCE UR: CLEAR
AST SERPL W P-5'-P-CCNC: 29 U/L (ref 9–39)
BASOPHILS # BLD AUTO: 0.05 X10*3/UL (ref 0–0.1)
BASOPHILS NFR BLD AUTO: 0.5 %
BILIRUB SERPL-MCNC: 0.9 MG/DL (ref 0–1.2)
BILIRUB UR STRIP.AUTO-MCNC: NEGATIVE MG/DL
BUN SERPL-MCNC: 19 MG/DL (ref 6–23)
CALCIUM SERPL-MCNC: 9.6 MG/DL (ref 8.6–10.3)
CHLORIDE SERPL-SCNC: 95 MMOL/L (ref 98–107)
CO2 SERPL-SCNC: 25 MMOL/L (ref 21–32)
COLOR UR: YELLOW
CREAT SERPL-MCNC: 1.02 MG/DL (ref 0.5–1.3)
CRP SERPL-MCNC: 1.27 MG/DL
EGFRCR SERPLBLD CKD-EPI 2021: 86 ML/MIN/1.73M*2
EOSINOPHIL # BLD AUTO: 0.23 X10*3/UL (ref 0–0.7)
EOSINOPHIL NFR BLD AUTO: 2.3 %
ERYTHROCYTE [DISTWIDTH] IN BLOOD BY AUTOMATED COUNT: 13 % (ref 11.5–14.5)
ERYTHROCYTE [SEDIMENTATION RATE] IN BLOOD BY WESTERGREN METHOD: 54 MM/H (ref 0–20)
GLUCOSE SERPL-MCNC: 342 MG/DL (ref 74–99)
GLUCOSE UR STRIP.AUTO-MCNC: ABNORMAL MG/DL
HCT VFR BLD AUTO: 44 % (ref 41–52)
HGB BLD-MCNC: 15.1 G/DL (ref 13.5–17.5)
IMM GRANULOCYTES # BLD AUTO: 0.06 X10*3/UL (ref 0–0.7)
IMM GRANULOCYTES NFR BLD AUTO: 0.6 % (ref 0–0.9)
KETONES UR STRIP.AUTO-MCNC: ABNORMAL MG/DL
LEUKOCYTE ESTERASE UR QL STRIP.AUTO: NEGATIVE
LIPASE SERPL-CCNC: 31 U/L (ref 9–82)
LYMPHOCYTES # BLD AUTO: 2.48 X10*3/UL (ref 1.2–4.8)
LYMPHOCYTES NFR BLD AUTO: 24.6 %
MCH RBC QN AUTO: 28.8 PG (ref 26–34)
MCHC RBC AUTO-ENTMCNC: 34.3 G/DL (ref 32–36)
MCV RBC AUTO: 84 FL (ref 80–100)
MONOCYTES # BLD AUTO: 0.91 X10*3/UL (ref 0.1–1)
MONOCYTES NFR BLD AUTO: 9 %
NEUTROPHILS # BLD AUTO: 6.34 X10*3/UL (ref 1.2–7.7)
NEUTROPHILS NFR BLD AUTO: 63 %
NITRITE UR QL STRIP.AUTO: NEGATIVE
NRBC BLD-RTO: 0 /100 WBCS (ref 0–0)
PH UR STRIP.AUTO: 6.5 [PH]
PLATELET # BLD AUTO: 364 X10*3/UL (ref 150–450)
POTASSIUM SERPL-SCNC: 3.7 MMOL/L (ref 3.5–5.3)
PROT SERPL-MCNC: 7.7 G/DL (ref 6.4–8.2)
PROT UR STRIP.AUTO-MCNC: NEGATIVE MG/DL
RBC # BLD AUTO: 5.24 X10*6/UL (ref 4.5–5.9)
RBC # UR STRIP.AUTO: NEGATIVE /UL
SODIUM SERPL-SCNC: 131 MMOL/L (ref 136–145)
SP GR UR STRIP.AUTO: 1.03
UROBILINOGEN UR STRIP.AUTO-MCNC: NORMAL MG/DL
WBC # BLD AUTO: 10.1 X10*3/UL (ref 4.4–11.3)

## 2024-08-27 PROCEDURE — 80053 COMPREHEN METABOLIC PANEL: CPT | Performed by: PHYSICIAN ASSISTANT

## 2024-08-27 PROCEDURE — 83690 ASSAY OF LIPASE: CPT | Performed by: PHYSICIAN ASSISTANT

## 2024-08-27 PROCEDURE — 86140 C-REACTIVE PROTEIN: CPT | Performed by: PHYSICIAN ASSISTANT

## 2024-08-27 PROCEDURE — 99284 EMERGENCY DEPT VISIT MOD MDM: CPT | Mod: 25

## 2024-08-27 PROCEDURE — 85025 COMPLETE CBC W/AUTO DIFF WBC: CPT | Performed by: PHYSICIAN ASSISTANT

## 2024-08-27 PROCEDURE — 85652 RBC SED RATE AUTOMATED: CPT | Performed by: PHYSICIAN ASSISTANT

## 2024-08-27 PROCEDURE — 83036 HEMOGLOBIN GLYCOSYLATED A1C: CPT | Mod: AHULAB | Performed by: PHYSICIAN ASSISTANT

## 2024-08-27 PROCEDURE — 2550000001 HC RX 255 CONTRASTS: Performed by: PHYSICIAN ASSISTANT

## 2024-08-27 PROCEDURE — 36415 COLL VENOUS BLD VENIPUNCTURE: CPT | Performed by: PHYSICIAN ASSISTANT

## 2024-08-27 PROCEDURE — 2500000004 HC RX 250 GENERAL PHARMACY W/ HCPCS (ALT 636 FOR OP/ED): Performed by: PHYSICIAN ASSISTANT

## 2024-08-27 PROCEDURE — 81003 URINALYSIS AUTO W/O SCOPE: CPT | Performed by: PHYSICIAN ASSISTANT

## 2024-08-27 PROCEDURE — 74177 CT ABD & PELVIS W/CONTRAST: CPT

## 2024-08-27 PROCEDURE — 74177 CT ABD & PELVIS W/CONTRAST: CPT | Mod: FOREIGN READ | Performed by: RADIOLOGY

## 2024-08-27 PROCEDURE — 96374 THER/PROPH/DIAG INJ IV PUSH: CPT

## 2024-08-27 RX ORDER — DICYCLOMINE HYDROCHLORIDE 10 MG/1
20 CAPSULE ORAL 4 TIMES DAILY
Qty: 40 CAPSULE | Refills: 0 | Status: SHIPPED | OUTPATIENT
Start: 2024-08-27 | End: 2024-09-01

## 2024-08-27 RX ORDER — LORAZEPAM 2 MG/ML
1 INJECTION INTRAMUSCULAR ONCE
Status: COMPLETED | OUTPATIENT
Start: 2024-08-27 | End: 2024-08-27

## 2024-08-27 ASSESSMENT — PAIN SCALES - GENERAL: PAINLEVEL_OUTOF10: 7

## 2024-08-27 ASSESSMENT — PAIN DESCRIPTION - FREQUENCY: FREQUENCY: INTERMITTENT

## 2024-08-27 ASSESSMENT — COLUMBIA-SUICIDE SEVERITY RATING SCALE - C-SSRS
6. HAVE YOU EVER DONE ANYTHING, STARTED TO DO ANYTHING, OR PREPARED TO DO ANYTHING TO END YOUR LIFE?: NO
2. HAVE YOU ACTUALLY HAD ANY THOUGHTS OF KILLING YOURSELF?: NO
1. IN THE PAST MONTH, HAVE YOU WISHED YOU WERE DEAD OR WISHED YOU COULD GO TO SLEEP AND NOT WAKE UP?: NO

## 2024-08-27 ASSESSMENT — PAIN DESCRIPTION - PROGRESSION: CLINICAL_PROGRESSION: GRADUALLY WORSENING

## 2024-08-27 ASSESSMENT — PAIN DESCRIPTION - LOCATION: LOCATION: RECTUM

## 2024-08-27 ASSESSMENT — PAIN - FUNCTIONAL ASSESSMENT: PAIN_FUNCTIONAL_ASSESSMENT: 0-10

## 2024-08-27 ASSESSMENT — PAIN DESCRIPTION - PAIN TYPE: TYPE: ACUTE PAIN

## 2024-08-27 ASSESSMENT — PAIN DESCRIPTION - ONSET: ONSET: ONGOING

## 2024-08-27 ASSESSMENT — PAIN DESCRIPTION - DESCRIPTORS: DESCRIPTORS: THROBBING;ACHING;DISCOMFORT

## 2024-08-27 NOTE — ED PROVIDER NOTES
HPI   Chief Complaint   Patient presents with    Rectal Pain       History of present illness: 54-year-old male With history of hypertension, hypercholesterolemia, ADHD complains of worsening rectal pain for the last couple days.  Says that he has had rectal pain on and off for the last 8 months.  Patient states that he has been using magnesium and MiraLAX to make sure that he has had soft bowel movements.  He says he gets multiple liquid bowel movements daily.  Patient states he tried to use a suppository yesterday and he could not get it in.  Says the pain is 7 out of 10, burning, persistent.  He was seen by gastroenterologist recently and diagnosed with a presumed anal fissures and prescribed topical medications.  Denies fevers chills sweats nausea vomiting lightheadedness dizziness rectal bleeding.    Review of systems: Constitutional, eye, ENT, cardiovascular, respiratory, gastrointestinal, genitourinary, neurologic, musculoskeletal, dermatologic, hematologic, endocrine systems were evaluated and were negative unless otherwise specified in history of present illness.    Medications: Reviewed and per nursing note.    Family history: Denies relevant medical conditions.    Social history: Denies tobacco, alcohol, drug use.      Physical exam:    Appearance: Well-developed, well-nourished, nontoxic-appearing, alert and oriented x3. Talking in complete sentences.    HEENT:  Head normocephalic atraumatic, extraocular movements intact, mucous membranes are moist and pink, trachea is midline.    NECK:  Nml Inspection    Respiratory: Clear to auscultation bilaterally with normal bilateral excursion. No wheezes, rhonchi, rales.    Cardiovascular: Regular rate and rhythm, no murmurs rubs or gallops.    GI: Soft, nontender, nondistended    Male genital: No rashes or vesicles. Normal urethral meatus without discharge. Testicles nontender. No lymphadenopathy. Normal cremasteric reflexes.    Rectal: Normal external sphincter  tone with significant internal sphincter tone versus internal mass.  Could not tolerate internal examination.  Small hemorrhoid at about 4:00 external that is not thrombosed with no erythema induration.  No external abscess formation.  Small area of clumped tissue at 2:00 possible healing fissure.    Neuro:  Oriented x 3, Speech Clear, cranial nerves grossly intact.    Musculoskeletal: Patient spontaneously moves all 4 extremities.    Skin:  No rashes.              Patient History   Past Medical History:   Diagnosis Date    Age-related nuclear cataract, left eye 03/31/2016    Age-related nuclear cataract of left eye    Age-related nuclear cataract, right eye 03/31/2016    Age-related nuclear cataract of right eye    Encounter for screening examination for other mental health and behavioral disorders 11/02/2016    ADHD (attention deficit hyperactivity disorder) evaluation    Personal history of other diseases of the circulatory system     History of hypertension    Personal history of other endocrine, nutritional and metabolic disease     History of hyperlipidemia    Personal history of other endocrine, nutritional and metabolic disease 02/16/2020    History of obesity     Past Surgical History:   Procedure Laterality Date    TONSILLECTOMY  03/15/2016    Tonsillectomy With Adenoidectomy    TONSILLECTOMY  09/15/2017    Tonsillectomy     No family history on file.  Social History     Tobacco Use    Smoking status: Never    Smokeless tobacco: Never   Vaping Use    Vaping status: Never Used   Substance Use Topics    Alcohol use: Yes     Comment: rare    Drug use: Never       Physical Exam   ED Triage Vitals [08/27/24 1529]   Temperature Heart Rate Respirations BP   36.7 °C (98 °F) 84 18 (!) 155/101      Pulse Ox Temp Source Heart Rate Source Patient Position   96 % Temporal Monitor Sitting      BP Location FiO2 (%)     Left arm --       Physical Exam      ED Course & MDM   Diagnoses as of 08/27/24 2116   Rectal pain    Rectal tenesmus   Hyperglycemia                 No data recorded     [place] Coma Scale Score: 15 (08/27/24 1531 : Mayela Mayer RN)                           Medical Decision Making  CT abdomen pelvis w IV contrast   Final Result    1.  Severe fatty liver.  Lesion in the right hepatic lobe    demonstrating arterial enhancement.  Multiphasic imaging can be    obtained for characterization, preferably MRI with and without    contrast.    2.  No focal abnormality of the rectum    Signed by Ruddy Diaz MD       Labs Reviewed  COMPREHENSIVE METABOLIC PANEL - Abnormal     Glucose                       342 (*)                Sodium                        131 (*)                Potassium                     3.7                    Chloride                      95 (*)                 Bicarbonate                   25                     Anion Gap                     15                     Urea Nitrogen                 19                     Creatinine                    1.02                   eGFR                          86                     Calcium                       9.6                    Albumin                       4.2                    Alkaline Phosphatase          104                    Total Protein                 7.7                    AST                           29                     Bilirubin, Total              0.9                    ALT                           52                  SEDIMENTATION RATE, AUTOMATED - Abnormal     Sedimentation Rate            54 (*)              C-REACTIVE PROTEIN - Abnormal     C-Reactive Protein            1.27 (*)            URINALYSIS WITH REFLEX CULTURE AND MICROSCOPIC - Abnormal     Color, Urine                  Yellow                 Appearance, Urine             Clear                  Specific Gravity, Urine       1.032                  pH, Urine                     6.5                    Protein, Urine                NEGATIVE                Glucose,  Urine                OVER (4+) (*)               Blood, Urine                  NEGATIVE                Ketones, Urine                10 (1+) (*)               Bilirubin, Urine              NEGATIVE                Urobilinogen, Urine           Normal                 Nitrite, Urine                NEGATIVE                Leukocyte Esterase, Urine     NEGATIVE                    Narrative: OVER is reported when the result is greater than the clinically reportable range.  LIPASE - Normal     Lipase                        31                         Narrative: Venipuncture immediately after or during the administration of Metamizole may lead to falsely low results. Testing should be performed immediately prior to Metamizole dosing.  CBC WITH AUTO DIFFERENTIAL     WBC                           10.1                   nRBC                          0.0                    RBC                           5.24                   Hemoglobin                    15.1                   Hematocrit                    44.0                   MCV                           84                     MCH                           28.8                   MCHC                          34.3                   RDW                           13.0                   Platelets                     364                    Neutrophils %                 63.0                   Immature Granulocytes %, Automated   0.6                    Lymphocytes %                 24.6                   Monocytes %                   9.0                    Eosinophils %                 2.3                    Basophils %                   0.5                    Neutrophils Absolute          6.34                   Immature Granulocytes Absolute, Au*   0.06                   Lymphocytes Absolute          2.48                   Monocytes Absolute            0.91                   Eosinophils Absolute          0.23                   Basophils Absolute            0.05                 URINALYSIS WITH REFLEX CULTURE AND MICROSCOPIC         Narrative: The following orders were created for panel order Urinalysis with Reflex Culture and Microscopic.                  Procedure                               Abnormality         Status                                     ---------                               -----------         ------                                     Urinalysis with Reflex C...[[id number]]  Abnormal            Final result                               Extra Urine Gray Tube[[id number]]                                                                                         Please view results for these tests on the individual orders.  EXTRA URINE GRAY TUBE      54-year-old male with rectal pain.  Differential diagnosis of perianal abscess, anal fissure, hemorrhoid, colitis, intra-abdominal abscess, colonic mass.  Examination shows difficult internal examination with significant spasming of internal anal sphincter.  Small external hemorrhoid visualized that is nontender.  Normal abdominal and genital exam otherwise.      Given his significant spasming versus mass internally further evaluation will be necessary.  Ordered CBC CMP sed rate CRP urinalysis lipase CT abdomen pelvis with contrast.    Diagnostic studies show Glucose 342 which is elevated compared to previous.  Pseudohyponatremia 131, normal potassium, chloride minimally low at 95 normal bicarb and anion gap making DKA unlikely.  Normal renal and liver function.  CRP sed rate slightly elevated.  CBC with normal white blood cell count hemoglobin and hematocrit.  CT abdomen pelvis shows severe fatty liver lesion in the right hepatic lobe demonstrating arterial enhancement.  Recommend follow-up MRI.  No focal abnormality of rectum.    Patient had minimal improvement of symptoms with Ativan.  Patient plans to follow-up with his primary care provider tomorrow.  Adding a hemoglobin A1c for elevated blood sugar level.  Patient will  likely need to initiate hypoglycemics, with determination by primary care provider.  Etiology of the rectal discomfort is unclear.  Could be from recurrent diarrhea secondary to laxative use with secondary tenesmus.  Patient will be given Bentyl for symptoms.  Patient will be discharged to home with prescription.      Patient is educated in signs and symptoms of worsening symptoms and reasons to come back to the emergency department.  Will need to follow up with primary care provider.  Patient does not report social determinants of health impacting ability to obtain care that is needed.  Patient agrees with plan.    This is a transcription.  Text was reviewed for errors, but some transcription errors may remain.  Please call for any questions.        Procedure  Procedures     Duarte Perry PA-C  08/27/24 2112       Duarte Perry PA-C  08/27/24 2116

## 2024-08-27 NOTE — ED TRIAGE NOTES
Pt presents to ED from home for pain in his rectum for a couple of months. Pt went to a doctor's office for a GI appt and did hot have a rectal exam but gave him a cream but did not help. Patient had a colonoscopy and was told he had 2 polyps and some hemorrhoids. Patient cannot deal with the pain.

## 2024-08-28 ENCOUNTER — OFFICE VISIT (OUTPATIENT)
Dept: PRIMARY CARE | Facility: CLINIC | Age: 54
End: 2024-08-28
Payer: COMMERCIAL

## 2024-08-28 VITALS — SYSTOLIC BLOOD PRESSURE: 143 MMHG | DIASTOLIC BLOOD PRESSURE: 88 MMHG

## 2024-08-28 DIAGNOSIS — E11.9 TYPE 2 DIABETES MELLITUS WITHOUT COMPLICATION, WITHOUT LONG-TERM CURRENT USE OF INSULIN (MULTI): Primary | ICD-10-CM

## 2024-08-28 DIAGNOSIS — I10 PRIMARY HYPERTENSION: ICD-10-CM

## 2024-08-28 DIAGNOSIS — E78.2 MIXED HYPERLIPIDEMIA: ICD-10-CM

## 2024-08-28 LAB
EST. AVERAGE GLUCOSE BLD GHB EST-MCNC: 226 MG/DL
HBA1C MFR BLD: 9.5 %

## 2024-08-28 PROCEDURE — 3079F DIAST BP 80-89 MM HG: CPT | Performed by: INTERNAL MEDICINE

## 2024-08-28 PROCEDURE — 3077F SYST BP >= 140 MM HG: CPT | Performed by: INTERNAL MEDICINE

## 2024-08-28 PROCEDURE — 3050F LDL-C >= 130 MG/DL: CPT | Performed by: INTERNAL MEDICINE

## 2024-08-28 PROCEDURE — 3046F HEMOGLOBIN A1C LEVEL >9.0%: CPT | Performed by: INTERNAL MEDICINE

## 2024-08-28 PROCEDURE — 99213 OFFICE O/P EST LOW 20 MIN: CPT | Performed by: INTERNAL MEDICINE

## 2024-08-28 RX ORDER — METFORMIN HYDROCHLORIDE 500 MG/1
500 TABLET ORAL
Qty: 180 TABLET | Refills: 0 | Status: SHIPPED | OUTPATIENT
Start: 2024-08-28

## 2024-08-28 NOTE — PROGRESS NOTES
Subjective   Patient ID: Tevin Costello is a 54 y.o. male who presents for No chief complaint on file..    HPI Follow-up visit had been to the emergency room the blood sugar was elevated blood pressure was elevated he had been on vacation and had been off of his regular diet more recently has had some visual changes polyuria polydipsia had been losing weight when he was in the emergency room the blood sugars were greater than 330 after being reasonable most recently  Review of Systems    Objective   There were no vitals taken for this visit.    Physical Exam vital signs noted alert and oriented x 3 NCAT PERRLA EOMI no JVD chest clear to auscultation CV regular rate and rhythm S1-S2 without murmur gallop or rub extremities no clubbing cyanosis or edema normal distal pulses    Assessment/Plan impression hypertension hyperlipidemia obesity diabetes mellitus  Plan monitor blood pressure at home watching salt increasing water consumption for the diabetes he had been on metformin before he does not believe that there was any side effect from it to stop taking it after 1 prescription baby time to resume this to aid in his overall metabolic program and then to recheck blood sugars review if there was an A1c completed at that time and recheck discussed with wife see EMR for prescription    Review ER sheet

## 2024-08-29 ENCOUNTER — PHARMACY VISIT (OUTPATIENT)
Dept: PHARMACY | Facility: CLINIC | Age: 54
End: 2024-08-29
Payer: COMMERCIAL

## 2024-08-29 PROCEDURE — RXMED WILLOW AMBULATORY MEDICATION CHARGE

## 2024-09-03 ENCOUNTER — TELEPHONE (OUTPATIENT)
Dept: PRIMARY CARE | Facility: CLINIC | Age: 54
End: 2024-09-03

## 2024-09-06 DIAGNOSIS — E11.9 TYPE 2 DIABETES MELLITUS WITHOUT COMPLICATION, WITHOUT LONG-TERM CURRENT USE OF INSULIN (MULTI): Primary | ICD-10-CM

## 2024-09-12 ENCOUNTER — PHARMACY VISIT (OUTPATIENT)
Dept: PHARMACY | Facility: CLINIC | Age: 54
End: 2024-09-12
Payer: COMMERCIAL

## 2024-09-12 ENCOUNTER — TELEMEDICINE (OUTPATIENT)
Dept: PHARMACY | Facility: HOSPITAL | Age: 54
End: 2024-09-12
Payer: COMMERCIAL

## 2024-09-12 DIAGNOSIS — E11.9 TYPE 2 DIABETES MELLITUS WITHOUT COMPLICATION, WITHOUT LONG-TERM CURRENT USE OF INSULIN (MULTI): ICD-10-CM

## 2024-09-12 PROCEDURE — RXMED WILLOW AMBULATORY MEDICATION CHARGE

## 2024-09-12 RX ORDER — DAPAGLIFLOZIN 10 MG/1
10 TABLET, FILM COATED ORAL DAILY
Qty: 30 TABLET | Refills: 11 | Status: SHIPPED | OUTPATIENT
Start: 2024-09-12 | End: 2025-09-07

## 2024-09-12 RX ORDER — METHYLPHENIDATE HYDROCHLORIDE 36 MG/1
36 TABLET ORAL DAILY
Qty: 60 TABLET | Refills: 0 | OUTPATIENT
Start: 2024-09-12 | End: 2024-09-14 | Stop reason: SDUPTHER

## 2024-09-12 RX ORDER — METFORMIN HYDROCHLORIDE 500 MG/1
1000 TABLET, EXTENDED RELEASE ORAL
Qty: 60 TABLET | Refills: 11 | Status: SHIPPED | OUTPATIENT
Start: 2024-09-12 | End: 2025-09-07

## 2024-09-12 RX ORDER — TIRZEPATIDE 2.5 MG/.5ML
2.5 INJECTION, SOLUTION SUBCUTANEOUS WEEKLY
Qty: 2 ML | Refills: 0 | Status: SHIPPED | OUTPATIENT
Start: 2024-09-12

## 2024-09-12 ASSESSMENT — ENCOUNTER SYMPTOMS: POLYDIPSIA: 1

## 2024-09-12 NOTE — PROGRESS NOTES
[facility] ([facility])    Tevin Costello is a 54 y.o. male was referred to Clinical Pharmacy Team to complete a comprehensive medication review (CMR) with a pharmacist as part of the [employer] diabetes program.  Pharmacy team may also provide assistance in diabetes management per discussion with referring provider and/or endocrinology.    Referring Provider: Morales Clinton MD  Does patient follow with Endocrinology: No  Endocrinology Provider Name: N/A    Subjective   Allergies   Allergen Reactions    Nsaids (Non-Steroidal Anti-Inflammatory Drug) Anaphylaxis    Aspirin Swelling    Penicillin Swelling       [facility]  [address], [address]  [facility] [address]  Phone: [phone] Fax: [phone]    [facility] [address] [place] [facility] [place] [place] [facility] [address] [facility]  [address] [facility] [address]  Phone: [phone] Fax: [phone]    [facility] [address] [facility]  [address] [facility]  [address]  [facility] [address]  Phone: [phone] Fax: [phone]      Diabetes  He presents for his initial diabetic visit. He has type 2 diabetes mellitus. His disease course has been worsening. There are no hypoglycemic associated symptoms. Associated symptoms include polydipsia and polyuria. There are no hypoglycemic complications. Symptoms are improving. Risk factors for coronary artery disease include diabetes mellitus, male sex and obesity. Current diabetic treatments: Metformin 500 mg BID. He is compliant with treatment all of the time. His breakfast blood glucose range is generally >200 mg/dl. His overall blood glucose range is >200 mg/dl.       Objective     There were no vitals taken for this visit.     LAB  Lab Results   Component Value Date    BILITOT 0.9 08/27/2024    CALCIUM 9.6 08/27/2024    CO2 25 08/27/2024    CL 95 (L) 08/27/2024    CREATININE 1.02 08/27/2024    GLUCOSE 342 (H) 08/27/2024    ALKPHOS 104  "08/27/2024    K 3.7 08/27/2024    PROT 7.7 08/27/2024     (L) 08/27/2024    AST 29 08/27/2024    ALT 52 08/27/2024    BUN 19 08/27/2024    ANIONGAP 15 08/27/2024    MG 2.10 01/11/2021    ALBUMIN 4.2 08/27/2024    LIPASE 31 08/27/2024    GFRMALE >90 01/28/2022     Lab Results   Component Value Date    TRIG 254 (H) 05/07/2024    CHOL 271 (H) 05/07/2024    LDLCALC 185 (H) 05/07/2024    HDL 35.4 05/07/2024     Lab Results   Component Value Date    HGBA1C 9.5 (H) 08/27/2024     Estimated body mass index is 41.56 kg/m² as calculated from the following:    Height as of 8/27/24: 1.854 m (6' 1\").    Weight as of 8/27/24: 143 kg (315 lb).     Current Outpatient Medications on File Prior to Visit   Medication Sig Dispense Refill    amLODIPine (Norvasc) 5 mg tablet TAKE 1 TABLET BY MOUTH ONCE DAILY 90 tablet 0    atomoxetine (Strattera) 10 mg capsule Take 2 capsules by mouth daily 90 capsule 0    atomoxetine (Strattera) 25 mg capsule Take 1 capsule by mouth daily 90 capsule 0    atomoxetine (Strattera) 25 mg capsule Take 1 capsule by mouth daily (Patient not taking: Reported on 5/9/2024) 90 capsule 1    atomoxetine (Strattera) 25 mg capsule Take 1 capsule by mouth daily 90 capsule 1    docusate sodium (Colace) 100 mg capsule Take 1 capsule (100 mg) by mouth 2 times a day. 60 capsule 0    dupilumab (Dupixent Pen) 300 mg/2 mL pen injector Inject 300mg (1 pen) beneath the skin once every other week. 300 mL 11    lidocaine HCl (bulk) 2 %, NIFEdipine (bulk) 0.2 percent in ointment base Apply topically 2 times a day. 30 g 0    losartan-hydrochlorothiazide (Hyzaar) 50-12.5 mg tablet TAKE 2 TABLETS BY MOUTH ONCE DAILY 180 tablet 0    methylphenidate (Concerta) 36 mg daily tablet Take 1 tablet by mouth daily 60 tablet 0    methylphenidate CR (Concerta) 18 mg daily tablet TAKE 2 TABLETS BY MOUTH ONCE DAILY 60 tablet 0    montelukast (Singulair) 10 mg tablet TAKE 1 TABLET BY MOUTH EVERYDAY AT BEDTIME 90 tablet 0    polyethylene " glycol-electrolytes (Nulytely) 420 gram solution Drink 1/2 starting at 6 pm the night before your procedure then drink the 2nd 1/2 5 hours before procedure arrival tme (Patient not taking: Reported on 5/9/2024) 4000 mL 0    [DISCONTINUED] metFORMIN (Glucophage) 500 mg tablet Take 1 tablet (500 mg) by mouth 2 times daily (morning and late afternoon). 180 tablet 0     No current facility-administered medications on file prior to visit.        HISTORICAL PHARMACOTHERAPY (if relevant)  -N/A    Secondary Prevention:  Statin? No  ACE-I/ARB? Yes    Pertinent PMH Review:  PMH of Pancreatitis: No  PMH of Retinopathy: No  PMH of Urinary Tract Infections: No  PMH of MTC: No    SMBG:  - FBG still>200    ASSESSMENT/PLAN:  - Patient referred to employee health pharmacy team for enrollment in  Employee Health pharmacy program to assist with diabetes medication cost and management. 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 He was started on Metformin 500 mg BID within the last month which has lowered BG, but not significantly. Patent and wife state that they would like to pursue aggressive diabetes management, so we will make the following adjustments and follow up monthly:  - Start Mounjaro 2.5 mg once weekly  - Start Farxiga 10 mg once daily.  - Change Metformin 500 mg IR to 500 mg ER twice daily.     Employee Health Diabetes Program ([facility])  - Patient enrolled in  Employee diabetes program for $0 co-pays on diabetes medications/supplies. Enrollment should be active in 2-4 weeks.  - Requested [facility] enrollment date: 8/30/24  - PharmD Management Level: 4  -  Pharmacy fill location: [facility]    Assessment/Plan   Problem List Items Addressed This Visit       Type 2 diabetes mellitus without complication, without long-term current use of insulin (Multi)     - Start Mounjaro 2.5 mg once weekly  - Start Farxiga 10 mg once daily.  - Change Metformin 500 mg IR to  500 mg ER twice daily.         Relevant Medications    dapagliflozin propanediol (Farxiga) 10 mg    tirzepatide (Mounjaro) 2.5 mg/0.5 mL pen injector    metFORMIN XR (Glucophage-XR) 500 mg 24 hr tablet        Follow up: 10/3/24 at 830 am    Continue all meds under the continuation of care with the referring provider and clinical pharmacy team.    Caly Nair, PharmD     Verbal consent to manage patient's drug therapy was obtained from [the patient and/or an individual authorized to act on behalf of a patient]. They were informed they may decline to participate or withdraw from participation in pharmacy services at any time.

## 2024-09-12 NOTE — ASSESSMENT & PLAN NOTE
- Start Mounjaro 2.5 mg once weekly  - Start Farxiga 10 mg once daily.  - Change Metformin 500 mg IR to 500 mg ER twice daily.

## 2024-09-12 NOTE — Clinical Note
Patient enrolled in Employee diabetes program for $0 copays and he will be starting Mounjaro 2.5 mg and Farxiga 10 mg post this visit. He is determined to pursue aggressive follow up with pharmacy clinic and we will titrate monthly as tolerated.

## 2024-09-16 DIAGNOSIS — E11.9 TYPE 2 DIABETES MELLITUS WITHOUT COMPLICATION, WITHOUT LONG-TERM CURRENT USE OF INSULIN (MULTI): Primary | ICD-10-CM

## 2024-10-01 ENCOUNTER — APPOINTMENT (OUTPATIENT)
Dept: PRIMARY CARE | Facility: CLINIC | Age: 54
End: 2024-10-01
Payer: COMMERCIAL

## 2024-10-01 ENCOUNTER — APPOINTMENT (OUTPATIENT)
Dept: GASTROENTEROLOGY | Facility: CLINIC | Age: 54
End: 2024-10-01
Payer: COMMERCIAL

## 2024-10-01 VITALS — HEIGHT: 71 IN | BODY MASS INDEX: 44.1 KG/M2 | HEART RATE: 79 BPM | WEIGHT: 315 LBS

## 2024-10-01 DIAGNOSIS — K42.9 UMBILICAL HERNIA WITHOUT OBSTRUCTION AND WITHOUT GANGRENE: ICD-10-CM

## 2024-10-01 DIAGNOSIS — K76.0 NON-ALCOHOLIC FATTY LIVER DISEASE: ICD-10-CM

## 2024-10-01 DIAGNOSIS — K76.9 LIVER LESION: ICD-10-CM

## 2024-10-01 DIAGNOSIS — K60.2 ANAL FISSURE: Primary | ICD-10-CM

## 2024-10-01 PROCEDURE — 3008F BODY MASS INDEX DOCD: CPT | Performed by: INTERNAL MEDICINE

## 2024-10-01 PROCEDURE — 99215 OFFICE O/P EST HI 40 MIN: CPT | Performed by: INTERNAL MEDICINE

## 2024-10-01 PROCEDURE — 3046F HEMOGLOBIN A1C LEVEL >9.0%: CPT | Performed by: INTERNAL MEDICINE

## 2024-10-01 PROCEDURE — 3050F LDL-C >= 130 MG/DL: CPT | Performed by: INTERNAL MEDICINE

## 2024-10-01 PROCEDURE — 1036F TOBACCO NON-USER: CPT | Performed by: INTERNAL MEDICINE

## 2024-10-01 ASSESSMENT — ENCOUNTER SYMPTOMS: SHORTNESS OF BREATH: 0

## 2024-10-01 NOTE — PATIENT INSTRUCTIONS
Check MRI of the liver. Continue Miralax to avoid constipation which can lead to recurrent anal fissure. Monitor umbilical hernia and if it becomes larger or painful then contact the office and we will refer you to General Surgery for consideration of surgical repair. Follow-up with Endocrinology as scheduled for the diabetes.

## 2024-10-01 NOTE — PROGRESS NOTES
REASON FOR VISIT:  Change in bowel habits  PCP (requesting provider): Morales Clinton MD.    HPI:  Tevin Costello is a 54 y.o. male with a past medical history of high BMI (45), ANABEL, HTN, HLD, and DM being evaluated for change in bowel habits. CT A/P w/ contrast showed severe fatty liver and indeterminate right hepatic lobe lesion (8/2024).    The patient notes that since the colonoscopy that he has had rectal pain. He was evaluated in the ER and he was found to have very elevated blood sugar. Patient is scheduled to see Endocrinology and he started Mounjaro about 1 week ago. He notes he previously was having rectal pain. He was diagnosed with anal fissure back in 5/2024. He reports he was constipated back at that time and felt a tear. This pain is now resolved. He had recent colonoscopy as below. He also feels he may have umbilical hernia. He is taking Miralax and having a BM once daily.    PSurgHx: No abdominal surgeries     FamHx: No GI cancer     Prior Endoscopy:  -Colonoscopy (6/2024): Good prep, 6 mm sigmoid polyp (lymphoid aggregate), mild sigmoid diverticulosis, small IH/EH, hypertrophied anal papillae, otherwise normal colon.    PAST MEDICAL HISTORY  Past Medical History:   Diagnosis Date    Age-related nuclear cataract, left eye 03/31/2016    Age-related nuclear cataract of left eye    Age-related nuclear cataract, right eye 03/31/2016    Age-related nuclear cataract of right eye    Encounter for screening examination for other mental health and behavioral disorders 11/02/2016    ADHD (attention deficit hyperactivity disorder) evaluation    Personal history of other diseases of the circulatory system     History of hypertension    Personal history of other endocrine, nutritional and metabolic disease     History of hyperlipidemia    Personal history of other endocrine, nutritional and metabolic disease 02/16/2020    History of obesity       PAST SURGICAL HISTORY  Past Surgical History:   Procedure  Laterality Date    TONSILLECTOMY  03/15/2016    Tonsillectomy With Adenoidectomy    TONSILLECTOMY  09/15/2017    Tonsillectomy       FAMILY HISTORY  No family history on file.    SOCIAL HISTORY   reports that he has never smoked. He has never used smokeless tobacco. He reports current alcohol use. He reports that he does not use drugs.    REVIEW OF SYSTEMS  Review of Systems   Respiratory:  Negative for shortness of breath.    Cardiovascular:  Negative for chest pain.   All other systems reviewed and are negative.    A 10+ point review of systems was otherwise negative except as noted and per HPI.    ALLERGIES  Allergies   Allergen Reactions    Nsaids (Non-Steroidal Anti-Inflammatory Drug) Anaphylaxis    Aspirin Swelling    Bee Pollen Unknown    Milk Containing Products (Dairy) Unknown    Mold Unknown    Penicillin Swelling       MEDICATIONS  Current Outpatient Medications   Medication Instructions    amLODIPine (Norvasc) 5 mg tablet TAKE 1 TABLET BY MOUTH ONCE DAILY    atomoxetine (Strattera) 10 mg capsule Take 2 capsules by mouth daily    atomoxetine (Strattera) 25 mg capsule Take 1 capsule by mouth daily    atomoxetine (Strattera) 25 mg capsule Take 1 capsule by mouth daily    atomoxetine (Strattera) 25 mg capsule Take 1 capsule by mouth daily    docusate sodium (COLACE) 100 mg, oral, 2 times daily    dupilumab (Dupixent Pen) 300 mg/2 mL pen injector Inject 300mg (1 pen) beneath the skin once every other week.    Farxiga 10 mg, oral, Daily    lidocaine HCl (bulk) 2 %, NIFEdipine (bulk) 0.2 percent in ointment base Topical, 2 times daily    losartan-hydrochlorothiazide (Hyzaar) 50-12.5 mg tablet TAKE 2 TABLETS BY MOUTH ONCE DAILY    metFORMIN XR (GLUCOPHAGE-XR) 1,000 mg, oral, Daily with breakfast, Do not crush, chew, or split.    methylphenidate (Concerta) 36 mg daily tablet Take 1 tablet by mouth daily    methylphenidate CR (Concerta) 18 mg daily tablet TAKE 2 TABLETS BY MOUTH ONCE DAILY    methylphenidate ER  (Concerta) 36 mg extended release tablet Take 1 tablet by mouth daily    montelukast (SINGULAIR) 10 mg, oral, Nightly    Mounjaro 2.5 mg, subcutaneous, Weekly    polyethylene glycol-electrolytes (Nulytely) 420 gram solution Drink 1/2 starting at 6 pm the night before your procedure then drink the 2nd 1/2 5 hours before procedure arrival tme       VITALS  Vitals:    10/01/24 1421   Pulse: 79      Body mass index is 45.33 kg/m².    PHYSICAL EXAM  CONSTITUTIONAL: NAD, appears stated age  EYES: anicteric sclera, sclera clear  HEAD: normocephalic, atraumatic   NECK: supple   PULMONARY: CTAB  CARDIOVASCULAR: RRR, no M/R/G appreciated   ABDOMEN: small umbilical hernia, soft, NTND, +BS, no rebound or guarding   MUSCULOSKELETAL: no edema  SKIN: no jaundice   PSYCHIATRIC: AOx3, appropriate insight and judgement    LABS  WBC   Date Value   08/27/2024 10.1 x10*3/uL   05/07/2024 9.3 x10*3/uL   01/28/2022 8.7 x10E9/L   01/15/2021 15.1 x10E9/L (H)   01/14/2021 11.6 x10E9/L (H)     Hemoglobin (g/dL)   Date Value   08/27/2024 15.1   05/07/2024 14.2   01/28/2022 14.6   01/15/2021 13.8   01/14/2021 13.6     Platelets   Date Value   08/27/2024 364 x10*3/uL   05/07/2024 377 x10*3/uL   01/28/2022 403 x10E9/L   01/15/2021 460 x10E9/L (H)   01/14/2021 454 x10E9/L (H)     Sodium (mmol/L)   Date Value   08/27/2024 131 (L)   05/07/2024 141   01/28/2022 141   01/15/2021 139   01/14/2021 138     Potassium (mmol/L)   Date Value   08/27/2024 3.7   05/07/2024 4.2   01/28/2022 4.1   01/15/2021 4.1   01/14/2021 3.8     Chloride (mmol/L)   Date Value   08/27/2024 95 (L)   05/07/2024 105   01/28/2022 103   01/15/2021 103   01/14/2021 103     Bicarbonate (mmol/L)   Date Value   08/27/2024 25   05/07/2024 30   01/28/2022 29   01/15/2021 26   01/14/2021 26     Urea Nitrogen (mg/dL)   Date Value   08/27/2024 19   05/07/2024 21   01/28/2022 18   01/15/2021 23   01/14/2021 23     Creatinine (mg/dL)   Date Value   08/27/2024 1.02   05/07/2024 0.86    01/28/2022 0.92   01/15/2021 0.76   01/14/2021 0.79     Calcium (mg/dL)   Date Value   08/27/2024 9.6   05/07/2024 9.1   01/28/2022 9.2   01/15/2021 8.3 (L)   01/14/2021 8.3 (L)     Total Protein (g/dL)   Date Value   08/27/2024 7.7   05/07/2024 7.1   01/15/2021 6.0 (L)   01/14/2021 6.2 (L)   01/13/2021 6.5     Bilirubin, Total (mg/dL)   Date Value   08/27/2024 0.9   05/07/2024 0.4     Total Bilirubin (mg/dL)   Date Value   01/15/2021 0.5   01/14/2021 0.5   01/13/2021 0.4     Alkaline Phosphatase (U/L)   Date Value   08/27/2024 104   05/07/2024 81   01/15/2021 46   01/14/2021 47   01/13/2021 47     ALT (U/L)   Date Value   08/27/2024 52   05/07/2024 48     ALT (SGPT) (U/L)   Date Value   01/15/2021 48   01/14/2021 51   01/13/2021 50     AST (U/L)   Date Value   08/27/2024 29   05/07/2024 21   01/15/2021 15   01/14/2021 18   01/13/2021 22     Glucose (mg/dL)   Date Value   08/27/2024 342 (H)   05/07/2024 133 (H)   01/28/2022 81   01/15/2021 180 (H)   01/14/2021 229 (H)     Lipase (U/L)   Date Value   08/27/2024 31     C-Reactive Protein (mg/dL)   Date Value   08/27/2024 1.27 (H)     CRP (mg/dL)   Date Value   01/15/2021 0.60   01/14/2021 1.01 (A)   01/13/2021 1.81 (A)       ASSESSMENT/PLAN  Tevinmela Costello is a 54 y.o. male with a past medical history of high BMI (45), ANABEL, HTN, HLD, and DM being evaluated for change in bowel habits. CT A/P w/ contrast showed severe fatty liver and indeterminate right hepatic lobe lesion (8/2024). Clinical history of rectal pain with anal fissure now resolved. He had recent incidental liver lesion on CT that warrants MRI for further characterization. He also was recently diagnosed with DM and newly on Mounjaro and we discussed that this can cause GI side effects. He has a small asymptomatic umbilical hernia. LFTs are not elevated and discussed weight loss through diet and exercise for the fatty liver.    -Check MRI liver   -Advised patient to continue Miralax to keep stool soft to  avoid recurrent anal fissure  -He has appointment with Endocrinology pending  -Recommend patient monitor his umbilical hernia and if it is enlarging or becoming painful to contact office and we will refer to General Surgery   -Recommend weight loss through diet and exercise in regards to the fatty liver     Follow-up in the office based on results of the above.    Signature: Galindo Gonzalez MD

## 2024-10-02 ENCOUNTER — APPOINTMENT (OUTPATIENT)
Dept: PRIMARY CARE | Facility: CLINIC | Age: 54
End: 2024-10-02
Payer: COMMERCIAL

## 2024-10-02 ENCOUNTER — OFFICE VISIT (OUTPATIENT)
Dept: PRIMARY CARE | Facility: CLINIC | Age: 54
End: 2024-10-02
Payer: COMMERCIAL

## 2024-10-02 VITALS — BODY MASS INDEX: 45.33 KG/M2 | DIASTOLIC BLOOD PRESSURE: 73 MMHG | SYSTOLIC BLOOD PRESSURE: 123 MMHG | WEIGHT: 315 LBS

## 2024-10-02 DIAGNOSIS — I10 PRIMARY HYPERTENSION: ICD-10-CM

## 2024-10-02 DIAGNOSIS — E11.9 TYPE 2 DIABETES MELLITUS WITHOUT COMPLICATION, WITHOUT LONG-TERM CURRENT USE OF INSULIN (MULTI): Primary | ICD-10-CM

## 2024-10-02 DIAGNOSIS — E78.2 MIXED HYPERLIPIDEMIA: ICD-10-CM

## 2024-10-02 PROCEDURE — 3074F SYST BP LT 130 MM HG: CPT | Performed by: INTERNAL MEDICINE

## 2024-10-02 PROCEDURE — 99213 OFFICE O/P EST LOW 20 MIN: CPT | Performed by: INTERNAL MEDICINE

## 2024-10-02 PROCEDURE — 3050F LDL-C >= 130 MG/DL: CPT | Performed by: INTERNAL MEDICINE

## 2024-10-02 PROCEDURE — 3078F DIAST BP <80 MM HG: CPT | Performed by: INTERNAL MEDICINE

## 2024-10-02 PROCEDURE — 3046F HEMOGLOBIN A1C LEVEL >9.0%: CPT | Performed by: INTERNAL MEDICINE

## 2024-10-02 RX ORDER — BLOOD-GLUCOSE,RECEIVER,CONT
EACH MISCELLANEOUS
Qty: 1 EACH | Refills: 0 | Status: SHIPPED | OUTPATIENT
Start: 2024-10-02 | End: 2024-10-03

## 2024-10-02 RX ORDER — BLOOD-GLUCOSE SENSOR
EACH MISCELLANEOUS
Qty: 3 EACH | Refills: 1 | Status: SHIPPED | OUTPATIENT
Start: 2024-10-02 | End: 2024-10-03

## 2024-10-02 NOTE — PROGRESS NOTES
Subjective   Patient ID: Tevin Costello is a 54 y.o. male who presents for No chief complaint on file..    HPI follow-up visit no chest pain no shortness of breath no side effect with medication blood sugars averaging around 100 now without hypoglycemia on the metformin and the Mounjaro as well as the rest of his diabetes medicine his bowels have been improved he did have a CT scan with a fatty liver and is having a follow-up MRI of the liver for other findings    Review of Systems    Objective   There were no vitals taken for this visit.    Physical Exam vital signs noted alert and oriented x 3 NCAT no icterus no jaundice no pallor no JVD or bruit chest clear to auscultation CV regular rate and rhythm S1-S2 abdomen obese soft nontender normal active bowel sounds extremities no clubbing cyanosis or edema normal distal pulses    Assessment/Plan impression obesity diabetes mellitus hypertension?  Hyperlipidemia?  Fatty liver?    Plan okay for Dexcom continuous glucose monitoring see EMR diet weight loss exercise continue with diabetes medications continue with the same dose so far did not want or need additional blood work at this point but may consider in the future good water consumption recheck 3 months based on above

## 2024-10-03 ENCOUNTER — APPOINTMENT (OUTPATIENT)
Dept: PHARMACY | Facility: HOSPITAL | Age: 54
End: 2024-10-03
Payer: COMMERCIAL

## 2024-10-03 DIAGNOSIS — E11.9 TYPE 2 DIABETES MELLITUS WITHOUT COMPLICATION, WITHOUT LONG-TERM CURRENT USE OF INSULIN (MULTI): Primary | ICD-10-CM

## 2024-10-03 PROCEDURE — RXMED WILLOW AMBULATORY MEDICATION CHARGE

## 2024-10-03 RX ORDER — BLOOD-GLUCOSE SENSOR
EACH MISCELLANEOUS
Qty: 2 EACH | Refills: 11 | Status: SHIPPED | OUTPATIENT
Start: 2024-10-03

## 2024-10-03 RX ORDER — TIRZEPATIDE 5 MG/.5ML
5 INJECTION, SOLUTION SUBCUTANEOUS WEEKLY
Qty: 2 ML | Refills: 1 | Status: SHIPPED | OUTPATIENT
Start: 2024-10-03

## 2024-10-03 ASSESSMENT — ENCOUNTER SYMPTOMS: POLYDIPSIA: 1

## 2024-10-03 NOTE — PROGRESS NOTES
[facility] ([facility])    Tevin Costello is a 54 y.o. male was referred to Clinical Pharmacy Team to complete a comprehensive medication review (CMR) with a pharmacist as part of the [employer] diabetes program.  Pharmacy team may also provide assistance in diabetes management per discussion with referring provider and/or endocrinology.    Referring Provider: Morales Clinton MD  Does patient follow with Endocrinology: No  Endocrinology Provider Name: N/A    Subjective   Allergies   Allergen Reactions    Nsaids (Non-Steroidal Anti-Inflammatory Drug) Anaphylaxis    Aspirin Swelling    Bee Pollen Unknown    Milk Containing Products (Dairy) Unknown    Mold Unknown    Penicillin Swelling       [facility]  [address], [address]  [facility] [address]  Phone: [phone] Fax: [phone]    [facility] [address] [place] [facility] [address] [facility]  [address] [facility] [address]  Phone: [phone] Fax: [phone]    [facility] [address] [facility]  [address] [facility]  [address]  [facility] [address]  Phone: [phone] Fax: [phone]      Diabetes  He presents for his initial diabetic visit. He has type 2 diabetes mellitus. His disease course has been worsening. There are no hypoglycemic associated symptoms. Associated symptoms include polydipsia and polyuria. There are no hypoglycemic complications. Symptoms are improving. Risk factors for coronary artery disease include diabetes mellitus, male sex and obesity. Current diabetic treatments: Metformin 500 mg BID; Mounjaro 2.5 mg weekly, Farxiga 10 mg daily. He is compliant with treatment all of the time. His home blood glucose trend is decreasing steadily. His breakfast blood glucose range is generally  mg/dl. His overall blood glucose range is  mg/dl.       Objective     There were no vitals taken for this visit.     LAB  Lab Results  "  Component Value Date    BILITOT 0.9 08/27/2024    CALCIUM 9.6 08/27/2024    CO2 25 08/27/2024    CL 95 (L) 08/27/2024    CREATININE 1.02 08/27/2024    GLUCOSE 342 (H) 08/27/2024    ALKPHOS 104 08/27/2024    K 3.7 08/27/2024    PROT 7.7 08/27/2024     (L) 08/27/2024    AST 29 08/27/2024    ALT 52 08/27/2024    BUN 19 08/27/2024    ANIONGAP 15 08/27/2024    MG 2.10 01/11/2021    ALBUMIN 4.2 08/27/2024    LIPASE 31 08/27/2024    GFRMALE >90 01/28/2022     Lab Results   Component Value Date    TRIG 254 (H) 05/07/2024    CHOL 271 (H) 05/07/2024    LDLCALC 185 (H) 05/07/2024    HDL 35.4 05/07/2024     Lab Results   Component Value Date    HGBA1C 9.5 (H) 08/27/2024     Estimated body mass index is 45.33 kg/m² as calculated from the following:    Height as of 10/1/24: 1.803 m (5' 11\").    Weight as of 10/2/24: 147 kg (325 lb).     Current Outpatient Medications on File Prior to Visit   Medication Sig Dispense Refill    amLODIPine (Norvasc) 5 mg tablet TAKE 1 TABLET BY MOUTH ONCE DAILY 90 tablet 0    atomoxetine (Strattera) 10 mg capsule Take 2 capsules by mouth daily 90 capsule 0    atomoxetine (Strattera) 25 mg capsule Take 1 capsule by mouth daily 90 capsule 0    atomoxetine (Strattera) 25 mg capsule Take 1 capsule by mouth daily 90 capsule 1    atomoxetine (Strattera) 25 mg capsule Take 1 capsule by mouth daily 90 capsule 1    dapagliflozin propanediol (Farxiga) 10 mg Take 1 tablet (10 mg) by mouth once daily. 30 tablet 11    docusate sodium (Colace) 100 mg capsule Take 1 capsule (100 mg) by mouth 2 times a day. 60 capsule 0    dupilumab (Dupixent Pen) 300 mg/2 mL pen injector Inject 300mg (1 pen) beneath the skin once every other week. 300 mL 11    lidocaine HCl (bulk) 2 %, NIFEdipine (bulk) 0.2 percent in ointment base Apply topically 2 times a day. 30 g 0    losartan-hydrochlorothiazide (Hyzaar) 50-12.5 mg tablet TAKE 2 TABLETS BY MOUTH ONCE DAILY 180 tablet 0    metFORMIN XR (Glucophage-XR) 500 mg 24 hr " tablet Take 2 tablets (1,000 mg) by mouth once daily with breakfast. Do not crush, chew, or split. 60 tablet 11    methylphenidate (Concerta) 36 mg daily tablet Take 1 tablet by mouth daily 60 tablet 0    methylphenidate CR (Concerta) 18 mg daily tablet TAKE 2 TABLETS BY MOUTH ONCE DAILY 60 tablet 0    methylphenidate ER (Concerta) 36 mg extended release tablet Take 1 tablet by mouth daily 90 tablet 0    montelukast (Singulair) 10 mg tablet TAKE 1 TABLET BY MOUTH EVERYDAY AT BEDTIME 90 tablet 0    polyethylene glycol-electrolytes (Nulytely) 420 gram solution Drink 1/2 starting at 6 pm the night before your procedure then drink the 2nd 1/2 5 hours before procedure arrival tme 4000 mL 0    [DISCONTINUED] Dexcom G7  misc Use as instructed 1 each 0    [DISCONTINUED] Dexcom G7 Sensor device Use as directed 3 each 1    [DISCONTINUED] tirzepatide (Mounjaro) 2.5 mg/0.5 mL pen injector Inject 2.5 mg under the skin 1 (one) time per week. 2 mL 0     No current facility-administered medications on file prior to visit.        HISTORICAL PHARMACOTHERAPY (if relevant)  -N/A    Secondary Prevention:  Statin? No  ACE-I/ARB? Yes    Pertinent PMH Review:  PMH of Pancreatitis: No  PMH of Retinopathy: No  PMH of Urinary Tract Infections: No  PMH of MTC: No    SMBG:  - FBG still>200    ASSESSMENT/PLAN:  - Since last visit, patient has shown rapid and remarkable improvement in BG values with no side effects or hypoglycemia.Patent and wife state that they would like to pursue aggressive diabetes management, so we will make the following adjustments and follow up monthly:  - Increase to Mounjaro 5 mg once weekly  - Continue Farxiga 10 mg once daily.  - Continue Metformin 500 mg ER twice daily.  - Trial Freestyle Britta 3 sensors (via cash price) for 1-3 months for information on impact of diet on BG values     [employer] Diabetes Program ([facility])  - Patient enrolled in  Employee diabetes program for $0 co-pays on diabetes  medications/supplies. Enrollment should be active in 2-4 weeks.  - Requested [facility] enrollment date: 8/30/24  - PharmD Management Level: 4  -  Pharmacy fill location: [facility]    Assessment/Plan   Problem List Items Addressed This Visit       Type 2 diabetes mellitus without complication, without long-term current use of insulin (Multi) - Primary     - Increase to Mounjaro 5 mg once weekly  - Continue Farxiga 10 mg once daily.  - Continue Metformin 500 mg ER twice daily.  - Trial Freestyle Britta 3 sensors (via cash price) for 1-3 months for information on impact of diet on BG values         Relevant Medications    tirzepatide (Mounjaro) 5 mg/0.5 mL pen injector    blood-glucose sensor (FreeStyle Britta 3 Plus Sensor) device        Follow up: 10/3/24 at 830 am    Continue all meds under the continuation of care with the referring provider and clinical pharmacy team.    Clay Nair, PharmD     Verbal consent to manage patient's drug therapy was obtained from [the patient and/or an individual authorized to act on behalf of a patient]. They were informed they may decline to participate or withdraw from participation in pharmacy services at any time.

## 2024-10-03 NOTE — ASSESSMENT & PLAN NOTE
- Increase to Mounjaro 5 mg once weekly  - Continue Farxiga 10 mg once daily.  - Continue Metformin 500 mg ER twice daily.  - Trial Freestyle Britta 3 sensors (via cash price) for 1-3 months for information on impact of diet on BG values

## 2024-10-08 DIAGNOSIS — I10 BENIGN ESSENTIAL HYPERTENSION: ICD-10-CM

## 2024-10-08 PROCEDURE — RXMED WILLOW AMBULATORY MEDICATION CHARGE

## 2024-10-10 RX ORDER — LOSARTAN POTASSIUM AND HYDROCHLOROTHIAZIDE 12.5; 5 MG/1; MG/1
2 TABLET ORAL DAILY
Qty: 180 TABLET | Refills: 0 | Status: SHIPPED | OUTPATIENT
Start: 2024-10-10 | End: 2025-10-10

## 2024-10-11 ENCOUNTER — PHARMACY VISIT (OUTPATIENT)
Dept: PHARMACY | Facility: CLINIC | Age: 54
End: 2024-10-11
Payer: COMMERCIAL

## 2024-10-11 PROCEDURE — RXMED WILLOW AMBULATORY MEDICATION CHARGE

## 2024-10-18 PROCEDURE — RXMED WILLOW AMBULATORY MEDICATION CHARGE

## 2024-10-19 ENCOUNTER — PHARMACY VISIT (OUTPATIENT)
Dept: PHARMACY | Facility: CLINIC | Age: 54
End: 2024-10-19
Payer: COMMERCIAL

## 2024-10-25 ENCOUNTER — HOSPITAL ENCOUNTER (OUTPATIENT)
Dept: RADIOLOGY | Facility: HOSPITAL | Age: 54
Discharge: HOME | End: 2024-10-25
Payer: COMMERCIAL

## 2024-10-25 DIAGNOSIS — K76.9 LIVER LESION: ICD-10-CM

## 2024-10-25 PROCEDURE — A9575 INJ GADOTERATE MEGLUMI 0.1ML: HCPCS | Performed by: PHYSICIAN ASSISTANT

## 2024-10-25 PROCEDURE — 2550000001 HC RX 255 CONTRASTS: Performed by: PHYSICIAN ASSISTANT

## 2024-10-25 PROCEDURE — 74183 MRI ABD W/O CNTR FLWD CNTR: CPT

## 2024-10-25 RX ORDER — GADOTERATE MEGLUMINE 376.9 MG/ML
28 INJECTION INTRAVENOUS
Status: COMPLETED | OUTPATIENT
Start: 2024-10-25 | End: 2024-10-25

## 2024-10-28 DIAGNOSIS — I10 BENIGN ESSENTIAL HYPERTENSION: ICD-10-CM

## 2024-10-31 ENCOUNTER — APPOINTMENT (OUTPATIENT)
Dept: PHARMACY | Facility: HOSPITAL | Age: 54
End: 2024-10-31
Payer: COMMERCIAL

## 2024-10-31 DIAGNOSIS — E11.9 TYPE 2 DIABETES MELLITUS WITHOUT COMPLICATION, WITHOUT LONG-TERM CURRENT USE OF INSULIN (MULTI): Primary | ICD-10-CM

## 2024-10-31 PROCEDURE — RXMED WILLOW AMBULATORY MEDICATION CHARGE

## 2024-10-31 RX ORDER — TIRZEPATIDE 7.5 MG/.5ML
7.5 INJECTION, SOLUTION SUBCUTANEOUS WEEKLY
Qty: 2 ML | Refills: 2 | Status: SHIPPED | OUTPATIENT
Start: 2024-10-31

## 2024-10-31 ASSESSMENT — ENCOUNTER SYMPTOMS: POLYDIPSIA: 0

## 2024-11-01 ENCOUNTER — PHARMACY VISIT (OUTPATIENT)
Dept: PHARMACY | Facility: CLINIC | Age: 54
End: 2024-11-01
Payer: COMMERCIAL

## 2024-11-01 PROCEDURE — RXMED WILLOW AMBULATORY MEDICATION CHARGE

## 2024-11-02 RX ORDER — AMLODIPINE BESYLATE 5 MG/1
5 TABLET ORAL DAILY
Qty: 90 TABLET | Refills: 0 | Status: SHIPPED | OUTPATIENT
Start: 2024-11-02 | End: 2025-11-02

## 2024-11-03 DIAGNOSIS — Z00.00 HEALTH CARE MAINTENANCE: ICD-10-CM

## 2024-11-03 PROCEDURE — RXMED WILLOW AMBULATORY MEDICATION CHARGE

## 2024-11-06 ENCOUNTER — PHARMACY VISIT (OUTPATIENT)
Dept: PHARMACY | Facility: CLINIC | Age: 54
End: 2024-11-06
Payer: COMMERCIAL

## 2024-11-06 RX ORDER — MONTELUKAST SODIUM 10 MG/1
10 TABLET ORAL NIGHTLY
Qty: 90 TABLET | Refills: 0 | Status: SHIPPED | OUTPATIENT
Start: 2024-11-06

## 2024-11-13 PROCEDURE — RXMED WILLOW AMBULATORY MEDICATION CHARGE

## 2024-11-14 ENCOUNTER — PHARMACY VISIT (OUTPATIENT)
Dept: PHARMACY | Facility: CLINIC | Age: 54
End: 2024-11-14
Payer: COMMERCIAL

## 2024-11-20 ENCOUNTER — PHARMACY VISIT (OUTPATIENT)
Dept: PHARMACY | Facility: CLINIC | Age: 54
End: 2024-11-20
Payer: COMMERCIAL

## 2024-11-20 PROCEDURE — RXMED WILLOW AMBULATORY MEDICATION CHARGE

## 2024-11-20 RX ORDER — METHYLPHENIDATE HYDROCHLORIDE 36 MG/1
36 TABLET ORAL DAILY
Qty: 90 TABLET | Refills: 0 | OUTPATIENT
Start: 2024-11-20

## 2024-12-05 ENCOUNTER — APPOINTMENT (OUTPATIENT)
Dept: PHARMACY | Facility: HOSPITAL | Age: 54
End: 2024-12-05
Payer: COMMERCIAL

## 2024-12-05 DIAGNOSIS — E11.9 TYPE 2 DIABETES MELLITUS WITHOUT COMPLICATION, WITHOUT LONG-TERM CURRENT USE OF INSULIN (MULTI): ICD-10-CM

## 2024-12-05 PROCEDURE — RXMED WILLOW AMBULATORY MEDICATION CHARGE

## 2024-12-05 RX ORDER — TIRZEPATIDE 10 MG/.5ML
10 INJECTION, SOLUTION SUBCUTANEOUS WEEKLY
Qty: 2 ML | Refills: 1 | Status: SHIPPED | OUTPATIENT
Start: 2024-12-05

## 2024-12-05 ASSESSMENT — ENCOUNTER SYMPTOMS: POLYDIPSIA: 0

## 2024-12-05 NOTE — ASSESSMENT & PLAN NOTE
- Increase to Mounjaro 10 mg once weekly  - Stop Farxiga 10 mg once daily.  - Continue Metformin 500 mg ER twice daily.  - Trial Freestyle Britta 3 sensors (via cash price) for 1-3 months for information on impact of diet on BG values  
Yes

## 2024-12-05 NOTE — PROGRESS NOTES
[facility] (VB[facility])    Tevin Costello is a 54 y.o. male was referred to Clinical Pharmacy Team to complete a comprehensive medication review (CMR) with a pharmacist as part of the [employer] Based [employer] diabetes program.  Pharmacy team may also provide assistance in diabetes management per discussion with referring provider and/or endocrinology.    Referring Provider: Morales Clinton MD  Does patient follow with Endocrinology: No  Endocrinology Provider Name: N/A    Subjective   Allergies   Allergen Reactions    Nsaids (Non-Steroidal Anti-Inflammatory Drug) Anaphylaxis    Aspirin Swelling    Bee Pollen Unknown    Milk Containing Products (Dairy) Unknown    Mold Unknown    Penicillin Swelling       [facility]  [address], [address]  [facility] [address]  Phone: [phone] Fax: [phone]    [facility] [address] [place] [facility] [address] [facility]  [address] [facility] [address]  Phone: [phone] Fax: [phone]    [facility] [address] [facility]  [address] [facility]  [address]  [facility] [address]  Phone: [phone] Fax: [phone]      Diabetes  He presents for his follow-up diabetic visit. He has type 2 diabetes mellitus. His disease course has been improving. There are no hypoglycemic associated symptoms. Pertinent negatives for diabetes include no polydipsia and no polyuria. There are no hypoglycemic complications. Symptoms are improving. Risk factors for coronary artery disease include diabetes mellitus, male sex and obesity. Current diabetic treatments: Metformin 500 mg BID; Mounjaro 5 mg weekly, Farxiga 10 mg daily. He is compliant with treatment all of the time. His home blood glucose trend is decreasing steadily. His breakfast blood glucose range is generally  mg/dl. His overall blood glucose range is  mg/dl.       Objective     There were no vitals taken for this visit.     LAB  Lab  "Results   Component Value Date    BILITOT 0.9 08/27/2024    CALCIUM 9.6 08/27/2024    CO2 25 08/27/2024    CL 95 (L) 08/27/2024    CREATININE 1.02 08/27/2024    GLUCOSE 342 (H) 08/27/2024    ALKPHOS 104 08/27/2024    K 3.7 08/27/2024    PROT 7.7 08/27/2024     (L) 08/27/2024    AST 29 08/27/2024    ALT 52 08/27/2024    BUN 19 08/27/2024    ANIONGAP 15 08/27/2024    MG 2.10 01/11/2021    ALBUMIN 4.2 08/27/2024    LIPASE 31 08/27/2024    GFRMALE >90 01/28/2022     Lab Results   Component Value Date    TRIG 254 (H) 05/07/2024    CHOL 271 (H) 05/07/2024    LDLCALC 185 (H) 05/07/2024    HDL 35.4 05/07/2024     Lab Results   Component Value Date    HGBA1C 9.5 (H) 08/27/2024     Estimated body mass index is 45.33 kg/m² as calculated from the following:    Height as of 10/1/24: 1.803 m (5' 11\").    Weight as of 10/2/24: 147 kg (325 lb).     Current Outpatient Medications on File Prior to Visit   Medication Sig Dispense Refill    amLODIPine (Norvasc) 5 mg tablet TAKE 1 TABLET BY MOUTH ONCE DAILY 90 tablet 0    atomoxetine (Strattera) 10 mg capsule Take 2 capsules by mouth daily 90 capsule 0    atomoxetine (Strattera) 25 mg capsule Take 1 capsule by mouth daily 90 capsule 0    atomoxetine (Strattera) 25 mg capsule Take 1 capsule by mouth daily 90 capsule 1    atomoxetine (Strattera) 25 mg capsule Take 1 capsule by mouth daily 90 capsule 1    blood-glucose sensor (FreeStyle Britta 3 Plus Sensor) device Replace every 15 days for testing blood sugars 2 each 11    docusate sodium (Colace) 100 mg capsule Take 1 capsule (100 mg) by mouth 2 times a day. 60 capsule 0    dupilumab (Dupixent Pen) 300 mg/2 mL pen injector Inject 300mg (1 pen) beneath the skin once every other week. 300 mL 11    lidocaine HCl (bulk) 2 %, NIFEdipine (bulk) 0.2 percent in ointment base Apply topically 2 times a day. 30 g 0    losartan-hydrochlorothiazide (Hyzaar) 50-12.5 mg tablet TAKE 2 TABLETS BY MOUTH ONCE DAILY 180 tablet 0    metFORMIN XR " (Glucophage-XR) 500 mg 24 hr tablet Take 2 tablets (1,000 mg) by mouth once daily with breakfast. Do not crush, chew, or split. 60 tablet 11    methylphenidate (Concerta) 36 mg daily tablet Take 1 tablet by mouth daily 60 tablet 0    methylphenidate CR (Concerta) 18 mg daily tablet TAKE 2 TABLETS BY MOUTH ONCE DAILY 60 tablet 0    methylphenidate ER (Concerta) 36 mg extended release tablet Take 1 tablet by mouth daily 90 tablet 0    montelukast (Singulair) 10 mg tablet TAKE 1 TABLET BY MOUTH EVERYDAY AT BEDTIME 90 tablet 0    polyethylene glycol-electrolytes (Nulytely) 420 gram solution Drink 1/2 starting at 6 pm the night before your procedure then drink the 2nd 1/2 5 hours before procedure arrival tme 4000 mL 0    [DISCONTINUED] dapagliflozin propanediol (Farxiga) 10 mg Take 1 tablet (10 mg) by mouth once daily. 30 tablet 11    [DISCONTINUED] tirzepatide (Mounjaro) 7.5 mg/0.5 mL pen injector Inject 7.5 mg under the skin 1 (one) time per week. 2 mL 2     No current facility-administered medications on file prior to visit.        HISTORICAL PHARMACOTHERAPY (if relevant)  -N/A    Secondary Prevention:  Statin? No  ACE-I/ARB? Yes    Pertinent PMH Review:  PMH of Pancreatitis: No  PMH of Retinopathy: No  PMH of Urinary Tract Infections: No  PMH of MTC: No    SMBG:      ASSESSMENT/PLAN:  - Since last visit, patient continues to show rapid and remarkable improvement in BG values with no side effects or hypoglycemia. Given that patient is currently glycemically well controlled, we will now focus on increasing Mounjaro to assist with weight loss (20 lbs lost so far), and we will discontinue Farxiga at this time since the risk of hypoglycemia outweigh the current benefits:  - Increase to Mounjaro 10 mg once weekly  - Stop Farxiga 10 mg once daily.  - Continue Metformin 500 mg ER twice daily.  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 Enrollment should be active in 2-4 weeks.  - Requested VBID enrollment date: 8/30/24  - PharmD Management Level: 4  -  Pharmacy fill location: [facility]    Assessment/Plan   Problem List Items Addressed This Visit       Type 2 diabetes mellitus without complication, without long-term current use of insulin (Multi)     - Increase to Mounjaro 10 mg once weekly  - Stop Farxiga 10 mg once daily.  - Continue Metformin 500 mg ER twice daily.  - Trial Freestyle Britta 3 sensors (via cash price) for 1-3 months for information on impact of diet on BG values               Follow up: 1/2/25 at 830 am    Continue all meds under the continuation of care with the referring provider and clinical pharmacy team.    Clay Nair, PharmD     Verbal consent to manage patient's drug therapy was obtained from [the patient and/or an individual authorized to act on behalf of a patient]. They were informed they may decline to participate or withdraw from participation in pharmacy services at any time.

## 2024-12-08 ENCOUNTER — PHARMACY VISIT (OUTPATIENT)
Dept: PHARMACY | Facility: CLINIC | Age: 54
End: 2024-12-08
Payer: COMMERCIAL

## 2024-12-16 PROCEDURE — RXMED WILLOW AMBULATORY MEDICATION CHARGE

## 2024-12-21 ENCOUNTER — PHARMACY VISIT (OUTPATIENT)
Dept: PHARMACY | Facility: CLINIC | Age: 54
End: 2024-12-21
Payer: COMMERCIAL

## 2024-12-22 PROCEDURE — RXMED WILLOW AMBULATORY MEDICATION CHARGE

## 2024-12-23 ENCOUNTER — PHARMACY VISIT (OUTPATIENT)
Dept: PHARMACY | Facility: CLINIC | Age: 54
End: 2024-12-23
Payer: COMMERCIAL

## 2024-12-29 PROCEDURE — RXMED WILLOW AMBULATORY MEDICATION CHARGE

## 2025-01-02 ENCOUNTER — APPOINTMENT (OUTPATIENT)
Dept: PHARMACY | Facility: HOSPITAL | Age: 55
End: 2025-01-02
Payer: COMMERCIAL

## 2025-01-02 DIAGNOSIS — E11.9 TYPE 2 DIABETES MELLITUS WITHOUT COMPLICATION, WITHOUT LONG-TERM CURRENT USE OF INSULIN (MULTI): ICD-10-CM

## 2025-01-02 ASSESSMENT — ENCOUNTER SYMPTOMS: POLYDIPSIA: 0

## 2025-01-02 NOTE — ASSESSMENT & PLAN NOTE
- Continue Mounjaro 10 mg once weekly  - Continue Metformin 500 mg ER twice daily.  - Restart Freestyle Britta 3 sensors (via cash price) for 1-3 months for information on impact of diet on BG values

## 2025-01-02 NOTE — PROGRESS NOTES
[facility] (VBID)    Tevin Costello is a 54 y.o. male was referred to Clinical Pharmacy Team to complete a comprehensive medication review (CMR) with a pharmacist as part of the Value Based [employer] Design diabetes program.  Pharmacy team may also provide assistance in diabetes management per discussion with referring provider and/or endocrinology.    Referring Provider: Morales Clinton MD  Does patient follow with Endocrinology: No  Endocrinology Provider Name: N/A    Subjective   Allergies   Allergen Reactions    Nsaids (Non-Steroidal Anti-Inflammatory Drug) Anaphylaxis    Aspirin Swelling    Bee Pollen Unknown    Milk Containing Products (Dairy) Unknown    Mold Unknown    Penicillin Swelling       [facility]  [address], [address]  [facility] [address]  Phone: [phone] Fax: [phone]    [facility] [address] [place] [facility] [address] [facility]  [address] [facility] [address]  Phone: [phone] Fax: [phone]    [facility] [address] [address]  [address]  [address]  [facility] [address]  Phone: [phone] Fax: [phone]      Diabetes  He presents for his follow-up diabetic visit. He has type 2 diabetes mellitus. His disease course has been improving. There are no hypoglycemic associated symptoms. Pertinent negatives for diabetes include no polydipsia and no polyuria. There are no hypoglycemic complications. Symptoms are improving. Risk factors for coronary artery disease include diabetes mellitus, male sex and obesity. Current diabetic treatments: Metformin 500 mg BID; Mounjaro 7.5 mg weekly, He is compliant with treatment all of the time. There is no change in his home blood glucose trend. His breakfast blood glucose range is generally  mg/dl. His overall blood glucose range is  mg/dl.       Objective     There were no vitals taken for this visit.     LAB  Lab Results   Component  "Value Date    BILITOT 0.9 08/27/2024    CALCIUM 9.6 08/27/2024    CO2 25 08/27/2024    CL 95 (L) 08/27/2024    CREATININE 1.02 08/27/2024    GLUCOSE 342 (H) 08/27/2024    ALKPHOS 104 08/27/2024    K 3.7 08/27/2024    PROT 7.7 08/27/2024     (L) 08/27/2024    AST 29 08/27/2024    ALT 52 08/27/2024    BUN 19 08/27/2024    ANIONGAP 15 08/27/2024    MG 2.10 01/11/2021    ALBUMIN 4.2 08/27/2024    LIPASE 31 08/27/2024    GFRMALE >90 01/28/2022     Lab Results   Component Value Date    TRIG 254 (H) 05/07/2024    CHOL 271 (H) 05/07/2024    LDLCALC 185 (H) 05/07/2024    HDL 35.4 05/07/2024     Lab Results   Component Value Date    HGBA1C 9.5 (H) 08/27/2024     Estimated body mass index is 45.33 kg/m² as calculated from the following:    Height as of 10/1/24: 1.803 m (5' 11\").    Weight as of 10/2/24: 147 kg (325 lb).     Current Outpatient Medications on File Prior to Visit   Medication Sig Dispense Refill    amLODIPine (Norvasc) 5 mg tablet TAKE 1 TABLET BY MOUTH ONCE DAILY 90 tablet 0    atomoxetine (Strattera) 10 mg capsule Take 2 capsules by mouth daily 90 capsule 0    atomoxetine (Strattera) 25 mg capsule Take 1 capsule by mouth daily 90 capsule 0    atomoxetine (Strattera) 25 mg capsule Take 1 capsule by mouth daily 90 capsule 1    atomoxetine (Strattera) 25 mg capsule Take 1 capsule by mouth daily 90 capsule 1    blood-glucose sensor (FreeStyle Britta 3 Plus Sensor) device Replace every 15 days for testing blood sugars 2 each 11    docusate sodium (Colace) 100 mg capsule Take 1 capsule (100 mg) by mouth 2 times a day. 60 capsule 0    dupilumab (Dupixent Pen) 300 mg/2 mL pen injector Inject 300mg (1 pen) beneath the skin once every other week. 300 mL 11    lidocaine HCl (bulk) 2 %, NIFEdipine (bulk) 0.2 percent in ointment base Apply topically 2 times a day. 30 g 0    losartan-hydrochlorothiazide (Hyzaar) 50-12.5 mg tablet TAKE 2 TABLETS BY MOUTH ONCE DAILY 180 tablet 0    metFORMIN XR (Glucophage-XR) 500 mg 24 " hr tablet Take 2 tablets (1,000 mg) by mouth once daily with breakfast. Do not crush, chew, or split. 60 tablet 11    methylphenidate (Concerta) 36 mg daily tablet Take 1 tablet by mouth daily 60 tablet 0    methylphenidate CR (Concerta) 18 mg daily tablet TAKE 2 TABLETS BY MOUTH ONCE DAILY 60 tablet 0    methylphenidate ER (Concerta) 36 mg extended release tablet Take 1 tablet by mouth daily 90 tablet 0    methylphenidate ER (Concerta) 36 mg extended release tablet Take 1 tablet by mouth daily 30 tablet 0    montelukast (Singulair) 10 mg tablet TAKE 1 TABLET BY MOUTH EVERYDAY AT BEDTIME 90 tablet 0    polyethylene glycol-electrolytes (Nulytely) 420 gram solution Drink 1/2 starting at 6 pm the night before your procedure then drink the 2nd 1/2 5 hours before procedure arrival tme 4000 mL 0    tirzepatide (Mounjaro) 10 mg/0.5 mL pen injector Inject 10 mg under the skin 1 (one) time per week. 2 mL 1     No current facility-administered medications on file prior to visit.        HISTORICAL PHARMACOTHERAPY (if relevant)  -N/A    Secondary Prevention:  Statin? No  ACE-I/ARB? Yes    Pertinent PMH Review:  PMH of Pancreatitis: No  PMH of Retinopathy: No  PMH of Urinary Tract Infections: No  PMH of MTC: No    SMBG:      ASSESSMENT/PLAN:  - Since last visit, patient remains well controlled from a symptom standpoint after dcing Farxiga and increasing Mounjaro to 10 mg, but he has not worn his Britta sensor over the holidays. He plans to restart Britta sensor for this month and he will receive updated labs before the next appointment. We will continue the below regimen until updated A1C received.  - Continue Mounjaro 10 mg once weekly  - Continue Metformin 500 mg ER twice daily.  - Restart Freestyle Britta 3 sensors (via cash price) for 1-3 months for information on impact of diet on BG values     Employee Health Diabetes Program (VBID)  - Patient enrolled in  Employee diabetes program for $0 co-pays on diabetes  medications/supplies. Enrollment should be active in 2-4 weeks.  - Requested VBID enrollment date: 8/30/24  - PharmD Management Level: 4  -  Pharmacy fill location: [facility]    Assessment/Plan   Problem List Items Addressed This Visit       Type 2 diabetes mellitus without complication, without long-term current use of insulin (Multi)     - Continue Mounjaro 10 mg once weekly  - Continue Metformin 500 mg ER twice daily.  - Restart Freestyle Britta 3 sensors (via cash price) for 1-3 months for information on impact of diet on BG values               Follow up: 1/30/25    Continue all meds under the continuation of care with the referring provider and clinical pharmacy team.    Clay Nair, PharmD     Verbal consent to manage patient's drug therapy was obtained from [the patient and/or an individual authorized to act on behalf of a patient]. They were informed they may decline to participate or withdraw from participation in pharmacy services at any time.

## 2025-01-04 ENCOUNTER — PHARMACY VISIT (OUTPATIENT)
Dept: PHARMACY | Facility: CLINIC | Age: 55
End: 2025-01-04
Payer: COMMERCIAL

## 2025-01-11 DIAGNOSIS — I10 BENIGN ESSENTIAL HYPERTENSION: ICD-10-CM

## 2025-01-11 PROCEDURE — RXMED WILLOW AMBULATORY MEDICATION CHARGE

## 2025-01-18 ENCOUNTER — PHARMACY VISIT (OUTPATIENT)
Dept: PHARMACY | Facility: CLINIC | Age: 55
End: 2025-01-18
Payer: COMMERCIAL

## 2025-01-18 DIAGNOSIS — E11.9 TYPE 2 DIABETES MELLITUS WITHOUT COMPLICATION, WITHOUT LONG-TERM CURRENT USE OF INSULIN (MULTI): ICD-10-CM

## 2025-01-18 DIAGNOSIS — I10 BENIGN ESSENTIAL HYPERTENSION: ICD-10-CM

## 2025-01-18 PROCEDURE — RXMED WILLOW AMBULATORY MEDICATION CHARGE

## 2025-01-18 RX ORDER — LOSARTAN POTASSIUM AND HYDROCHLOROTHIAZIDE 12.5; 5 MG/1; MG/1
2 TABLET ORAL DAILY
Qty: 180 TABLET | Refills: 0 | Status: SHIPPED | OUTPATIENT
Start: 2025-01-18 | End: 2026-01-18

## 2025-01-18 RX ORDER — METFORMIN HYDROCHLORIDE 500 MG/1
1000 TABLET, EXTENDED RELEASE ORAL
Qty: 60 TABLET | Refills: 1 | Status: SHIPPED | OUTPATIENT
Start: 2025-01-18 | End: 2026-01-13

## 2025-01-18 RX ORDER — LOSARTAN POTASSIUM AND HYDROCHLOROTHIAZIDE 12.5; 5 MG/1; MG/1
2 TABLET ORAL DAILY
Qty: 180 TABLET | Refills: 0 | Status: SHIPPED | OUTPATIENT
Start: 2025-01-18 | End: 2025-01-18 | Stop reason: SDUPTHER

## 2025-01-24 ENCOUNTER — OFFICE VISIT (OUTPATIENT)
Dept: OPHTHALMOLOGY | Facility: CLINIC | Age: 55
End: 2025-01-24
Payer: COMMERCIAL

## 2025-01-24 DIAGNOSIS — E11.9 TYPE 2 DIABETES MELLITUS WITHOUT COMPLICATION, WITHOUT LONG-TERM CURRENT USE OF INSULIN (MULTI): Primary | ICD-10-CM

## 2025-01-24 DIAGNOSIS — H25.13 AGE-RELATED NUCLEAR CATARACT OF BOTH EYES: ICD-10-CM

## 2025-01-24 DIAGNOSIS — H52.7 REFRACTION ERROR: ICD-10-CM

## 2025-01-24 ASSESSMENT — ENCOUNTER SYMPTOMS
NEUROLOGICAL NEGATIVE: 0
PSYCHIATRIC NEGATIVE: 0
EYES NEGATIVE: 0
RESPIRATORY NEGATIVE: 0
MUSCULOSKELETAL NEGATIVE: 0
HEMATOLOGIC/LYMPHATIC NEGATIVE: 0
ALLERGIC/IMMUNOLOGIC NEGATIVE: 0
GASTROINTESTINAL NEGATIVE: 0
CARDIOVASCULAR NEGATIVE: 0
CONSTITUTIONAL NEGATIVE: 0
ENDOCRINE NEGATIVE: 0

## 2025-01-24 ASSESSMENT — SLIT LAMP EXAM - LIDS
COMMENTS: NORMAL
COMMENTS: NORMAL

## 2025-01-24 ASSESSMENT — VISUAL ACUITY
OS_CC+: -1
OD_CC: 20/20
OS_CC: 20/20
METHOD: SNELLEN - LINEAR
CORRECTION_TYPE: GLASSES

## 2025-01-24 ASSESSMENT — CONF VISUAL FIELD
METHOD: COUNTING FINGERS
OS_NORMAL: 1
OD_INFERIOR_TEMPORAL_RESTRICTION: 0
OS_SUPERIOR_NASAL_RESTRICTION: 0
OD_SUPERIOR_NASAL_RESTRICTION: 0
OD_NORMAL: 1
OD_INFERIOR_NASAL_RESTRICTION: 0
OS_SUPERIOR_TEMPORAL_RESTRICTION: 0
OD_SUPERIOR_TEMPORAL_RESTRICTION: 0
OS_INFERIOR_TEMPORAL_RESTRICTION: 0
OS_INFERIOR_NASAL_RESTRICTION: 0

## 2025-01-24 ASSESSMENT — EXTERNAL EXAM - RIGHT EYE: OD_EXAM: NORMAL

## 2025-01-24 ASSESSMENT — REFRACTION_MANIFEST
OS_SPHERE: +1.00
OS_ADD: +2.50
OD_ADD: +2.50
OD_AXIS: 010
OS_CYLINDER: +0.75
OD_SPHERE: +1.00
OS_AXIS: 180
OD_CYLINDER: +1.00

## 2025-01-24 ASSESSMENT — TONOMETRY
OS_IOP_MMHG: 10
IOP_METHOD: GOLDMANN APPLANATION
OD_IOP_MMHG: 11

## 2025-01-24 ASSESSMENT — REFRACTION_WEARINGRX
OS_CYLINDER: +0.50
OS_AXIS: 007
OD_SPHERE: +1.00
OD_CYLINDER: +0.25
OD_ADD: +2.25
OS_SPHERE: +1.25
OD_AXIS: 005
OS_ADD: +2.25

## 2025-01-24 ASSESSMENT — CUP TO DISC RATIO
OS_RATIO: .3
OD_RATIO: .3

## 2025-01-24 ASSESSMENT — EXTERNAL EXAM - LEFT EYE: OS_EXAM: NORMAL

## 2025-01-24 NOTE — PROGRESS NOTES
Assessment/Plan   Problem List Items Addressed This Visit       Type 2 diabetes mellitus without complication, without long-term current use of insulin (Multi) - Primary     Advised no signs of diabetic changes on exam. Recommend to continue best sugar control and on need for annual checkup. Understands role of good BP control and weight loss if applicable. Discussed some components of selected studies like WESDR, DCCT, and UKPDS to describe the likely course of diabetes and effects on the eyes.           Age-related nuclear cataract of both eyes     Non significant cataract noted on exam. Will plan to continue to monitor with serial exam.            Refraction error     New Rx for glasses/SCL given per patient request. Patient's signature obtained to acknowledge and confirm that a paper copy of glasses/SCL Rx was given to patient in compliance with [employer] Eyeglass Rule. Electronic copy of Rx will also be available via [employer]/EPIC.               Provided reassurance regarding above diagnoses and care received in the office visit today. Discussed outcomes and options along with the importance of treatment compliance. Understands the importance of any follow up visits. Patient instructed to call/communicate with our office if any new issues, questions, or concerns.     Will plan to see back in 1 year full or sooner PRN

## 2025-01-24 NOTE — PATIENT INSTRUCTIONS
Thank you so much for choosing me to provide your care today!    If you were dilated your vision may remain blurry   or light sensitive for several hours.    The nature of eye and vision problems can require frequent follow up, please make every effort to adhere to any future appointments.    If you have any issues, questions, or concerns,   please do not hesitate to reach out.    If you receive a survey in regards to your care today, please mention any exceptional care my office staff and/or technicians provided.    You can reach our office at this number:    [phone]    Please consider signing up for and utilizing [employer]!  This is the best way to directly reach me or other  providers

## 2025-01-24 NOTE — LETTER
January 24, 2025    Morales Clinton MD  [address]  [facility] [address]  [facility] [address]    Patient: Tevin Costello   YOB: 1970   Date of Visit: 1/24/2025       Dear Dr. Morales Clinton MD:    Thank you for referring Tevin Costello to me for evaluation. Here is my assessment and plan of care:    Assessment/Plan:  Tevin was seen today for diabetic eye exam.  Diagnoses and all orders for this visit:  Type 2 diabetes mellitus without complication, without long-term current use of insulin (Multi) (Primary)  Age-related nuclear cataract of both eyes  Refraction error    Right eye:     Left eye:    [x] No diabetes mellitus (DM) retinopathy [x] No diabetes mellitus (DM) retinopathy    [] Mild non-proliferative retinopathy [] Mild non-proliferative retinopathy    [] Moderate non-proliferative retinopathy [] Moderate non-proliferative retinopathy    [] Severe non-proliferative retinopathy [] Severe non-proliferative retinopathy    [] Proliferative retinopathy   [] Proliferative retinopathy    [] Diabetic macular edema   [] Diabetic macular edema    Urged patient to continue to work on best blood sugar and blood pressure control  Advised patient to call if any new vision changes noted    Will plan to repeat evaluation in:   [] 3 months [] 6 months [] 9 months [x] 12 months [] Other    Below you can find relevant pieces of the exam. If you have questions, please do not hesitate to call me. I look forward to following Tevin along with you.         Sincerely,        Jersey Patton MD        CC:   No Recipients         External Exam         Right Left    External Normal Normal              Slit Lamp Exam         Right Left    Lids/Lashes Normal Normal    Conjunctiva/Sclera White and quiet White and quiet    Cornea Clear Clear    Anterior Chamber Deep and quiet Deep and quiet    Iris Round and reactive Round and reactive    Lens Trace Nuclear sclerosis Trace Nuclear sclerosis  "             Fundus Exam         Right Left    Vitreous Normal Normal    Disc Normal Normal    C/D Ratio .3 .3    Macula Normal Normal    Vessels Normal Normal    Periphery Normal Normal                   <div id=\"[email or web address]\"></div>     "

## 2025-01-24 NOTE — ASSESSMENT & PLAN NOTE
New Rx for glasses/SCL given per patient request. Patient's signature obtained to acknowledge and confirm that a paper copy of glasses/SCL Rx was given to patient in compliance with [facility] Eyeglass Rule. Electronic copy of Rx will also be available via [facility]/EPIC.

## 2025-01-27 ENCOUNTER — PHARMACY VISIT (OUTPATIENT)
Dept: PHARMACY | Facility: CLINIC | Age: 55
End: 2025-01-27
Payer: COMMERCIAL

## 2025-01-27 PROCEDURE — RXMED WILLOW AMBULATORY MEDICATION CHARGE

## 2025-01-30 ENCOUNTER — APPOINTMENT (OUTPATIENT)
Dept: PHARMACY | Facility: HOSPITAL | Age: 55
End: 2025-01-30
Payer: COMMERCIAL

## 2025-01-30 DIAGNOSIS — E11.9 TYPE 2 DIABETES MELLITUS WITHOUT COMPLICATION, WITHOUT LONG-TERM CURRENT USE OF INSULIN (MULTI): ICD-10-CM

## 2025-01-30 PROCEDURE — RXMED WILLOW AMBULATORY MEDICATION CHARGE

## 2025-01-30 RX ORDER — TIRZEPATIDE 12.5 MG/.5ML
12.5 INJECTION, SOLUTION SUBCUTANEOUS WEEKLY
Qty: 2 ML | Refills: 2 | Status: SHIPPED | OUTPATIENT
Start: 2025-01-30

## 2025-01-30 ASSESSMENT — ENCOUNTER SYMPTOMS: POLYDIPSIA: 0

## 2025-01-30 NOTE — PROGRESS NOTES
[facility] (VBID)    Tevin Costello is a 54 y.o. male was referred to Clinical Pharmacy Team to complete a comprehensive medication review (CMR) with a pharmacist as part of the [employer] diabetes program.  Pharmacy team may also provide assistance in diabetes management per discussion with referring provider and/or endocrinology.    Referring Provider: Morales Clinton MD  Does patient follow with Endocrinology: No  Endocrinology Provider Name: N/A    Subjective   Allergies   Allergen Reactions    Nsaids (Non-Steroidal Anti-Inflammatory Drug) Anaphylaxis    Aspirin Swelling    Bee Pollen Unknown    Milk Containing Products (Dairy) Unknown    Mold Unknown    Penicillin Swelling       [facility]  [address], [address]  [facility] [address]  Phone: [phone] Fax: [phone]    [facility] [address] [place] [facility] [address] [facility]  [address] [facility] [address]  Phone: [phone] Fax: [phone]    [facility] [address] [facility]  [address] [facility]  [address]  [facility] [address]  Phone: [phone] Fax: [phone]      Diabetes  He presents for his follow-up diabetic visit. He has type 2 diabetes mellitus. His disease course has been improving. There are no hypoglycemic associated symptoms. Pertinent negatives for diabetes include no polydipsia and no polyuria. There are no hypoglycemic complications. Symptoms are improving. Risk factors for coronary artery disease include diabetes mellitus, male sex and obesity. Current diabetic treatments: Metformin 500 mg BID; Mounjaro 10 mg weekly, He is compliant with treatment all of the time. There is no change in his home blood glucose trend. His breakfast blood glucose range is generally  mg/dl. His overall blood glucose range is  mg/dl.       Objective     There were no vitals taken for this visit.     LAB  Lab Results   Component  "Value Date    BILITOT 0.9 08/27/2024    CALCIUM 9.6 08/27/2024    CO2 25 08/27/2024    CL 95 (L) 08/27/2024    CREATININE 1.02 08/27/2024    GLUCOSE 342 (H) 08/27/2024    ALKPHOS 104 08/27/2024    K 3.7 08/27/2024    PROT 7.7 08/27/2024     (L) 08/27/2024    AST 29 08/27/2024    ALT 52 08/27/2024    BUN 19 08/27/2024    ANIONGAP 15 08/27/2024    MG 2.10 01/11/2021    ALBUMIN 4.2 08/27/2024    LIPASE 31 08/27/2024    GFRMALE >90 01/28/2022     Lab Results   Component Value Date    TRIG 254 (H) 05/07/2024    CHOL 271 (H) 05/07/2024    LDLCALC 185 (H) 05/07/2024    HDL 35.4 05/07/2024     Lab Results   Component Value Date    HGBA1C 9.5 (H) 08/27/2024     Estimated body mass index is 45.33 kg/m² as calculated from the following:    Height as of 10/1/24: 1.803 m (5' 11\").    Weight as of 10/2/24: 147 kg (325 lb).     Current Outpatient Medications on File Prior to Visit   Medication Sig Dispense Refill    amLODIPine (Norvasc) 5 mg tablet TAKE 1 TABLET BY MOUTH ONCE DAILY 90 tablet 0    blood-glucose sensor (FreeStyle Britta 3 Plus Sensor) device Replace every 15 days for testing blood sugars 2 each 11    losartan-hydrochlorothiazide (Hyzaar) 50-12.5 mg tablet TAKE 2 TABLETS BY MOUTH ONCE DAILY 180 tablet 0    metFORMIN XR (Glucophage-XR) 500 mg 24 hr tablet Take 2 tablets (1,000 mg) by mouth once daily with breakfast. Do not crush, chew, or split. 60 tablet 1    methylphenidate ER (Concerta) 36 mg extended release tablet Take 1 tablet by mouth daily 30 tablet 0    montelukast (Singulair) 10 mg tablet TAKE 1 TABLET BY MOUTH EVERYDAY AT BEDTIME 90 tablet 0    [DISCONTINUED] atomoxetine (Strattera) 10 mg capsule Take 2 capsules by mouth daily 90 capsule 0    [DISCONTINUED] atomoxetine (Strattera) 25 mg capsule Take 1 capsule by mouth daily 90 capsule 0    [DISCONTINUED] atomoxetine (Strattera) 25 mg capsule Take 1 capsule by mouth daily 90 capsule 1    [DISCONTINUED] atomoxetine (Strattera) 25 mg capsule Take 1 " capsule by mouth daily 90 capsule 1    [DISCONTINUED] docusate sodium (Colace) 100 mg capsule Take 1 capsule (100 mg) by mouth 2 times a day. 60 capsule 0    [DISCONTINUED] dupilumab (Dupixent Pen) 300 mg/2 mL pen injector Inject 300mg (1 pen) beneath the skin once every other week. 300 mL 11    [DISCONTINUED] lidocaine HCl (bulk) 2 %, NIFEdipine (bulk) 0.2 percent in ointment base Apply topically 2 times a day. 30 g 0    [DISCONTINUED] methylphenidate (Concerta) 36 mg daily tablet Take 1 tablet by mouth daily 60 tablet 0    [DISCONTINUED] methylphenidate CR (Concerta) 18 mg daily tablet TAKE 2 TABLETS BY MOUTH ONCE DAILY 60 tablet 0    [DISCONTINUED] methylphenidate ER (Concerta) 36 mg extended release tablet Take 1 tablet by mouth daily 90 tablet 0    [DISCONTINUED] methylphenidate ER (Concerta) 36 mg extended release tablet Take 1 tablet by mouth daily 30 tablet 0    [DISCONTINUED] polyethylene glycol-electrolytes (Nulytely) 420 gram solution Drink 1/2 starting at 6 pm the night before your procedure then drink the 2nd 1/2 5 hours before procedure arrival tme 4000 mL 0    [DISCONTINUED] tirzepatide (Mounjaro) 10 mg/0.5 mL pen injector Inject 10 mg under the skin 1 (one) time per week. 2 mL 1     No current facility-administered medications on file prior to visit.        HISTORICAL PHARMACOTHERAPY (if relevant)  -N/A    Secondary Prevention:  Statin? No  ACE-I/ARB? Yes    Pertinent PMH Review:  PMH of Pancreatitis: No  PMH of Retinopathy: No  PMH of Urinary Tract Infections: No  PMH of MTC: No    SMBG:  - FBG     ASSESSMENT/PLAN:  - Since last visit, patient remains well controlled from a symptom and BG standpoint after continuing Mounjaro 10 mg, but pt would like to increase Mounjaro to 12.5 mg for additional weight control benefit so we will increase and maintain that therapy for 2 months before assessing future increase.  - Increase to Mounjaro 12.5 mg once weekly.  - Continue Metformin 500 mg ER twice  daily.     Employee Health Diabetes Program (VBID)  - Patient enrolled in  Employee diabetes program for $0 co-pays on diabetes medications/supplies. Enrollment should be active in 2-4 weeks.  - Requested VBID enrollment date: 8/30/24  - PharmD Management Level: 4  -  Pharmacy fill location: [facility]    Assessment/Plan   Problem List Items Addressed This Visit       Type 2 diabetes mellitus without complication, without long-term current use of insulin (Multi)     - Increase to Mounjaro 12.5 mg once weekly  - Continue Metformin 500 mg ER twice daily.              Follow up: 3/20/25 at 820 am    Continue all meds under the continuation of care with the referring provider and clinical pharmacy team.    Clay Nair, PharmD     Verbal consent to manage patient's drug therapy was obtained from [the patient and/or an individual authorized to act on behalf of a patient]. They were informed they may decline to participate or withdraw from participation in pharmacy services at any time.

## 2025-01-31 ENCOUNTER — PHARMACY VISIT (OUTPATIENT)
Dept: PHARMACY | Facility: CLINIC | Age: 55
End: 2025-01-31
Payer: COMMERCIAL

## 2025-02-03 DIAGNOSIS — I10 BENIGN ESSENTIAL HYPERTENSION: ICD-10-CM

## 2025-02-04 ENCOUNTER — APPOINTMENT (OUTPATIENT)
Dept: PRIMARY CARE | Facility: CLINIC | Age: 55
End: 2025-02-04
Payer: COMMERCIAL

## 2025-02-04 VITALS — SYSTOLIC BLOOD PRESSURE: 144 MMHG | BODY MASS INDEX: 42.82 KG/M2 | DIASTOLIC BLOOD PRESSURE: 82 MMHG | WEIGHT: 307 LBS

## 2025-02-04 DIAGNOSIS — I10 PRIMARY HYPERTENSION: ICD-10-CM

## 2025-02-04 DIAGNOSIS — E11.9 TYPE 2 DIABETES MELLITUS WITHOUT COMPLICATION, WITHOUT LONG-TERM CURRENT USE OF INSULIN (MULTI): Primary | ICD-10-CM

## 2025-02-04 PROCEDURE — 99213 OFFICE O/P EST LOW 20 MIN: CPT | Performed by: INTERNAL MEDICINE

## 2025-02-04 PROCEDURE — 3077F SYST BP >= 140 MM HG: CPT | Performed by: INTERNAL MEDICINE

## 2025-02-04 PROCEDURE — 3079F DIAST BP 80-89 MM HG: CPT | Performed by: INTERNAL MEDICINE

## 2025-02-04 RX ORDER — AMLODIPINE BESYLATE 5 MG/1
5 TABLET ORAL DAILY
Qty: 90 TABLET | Refills: 0 | Status: SHIPPED | OUTPATIENT
Start: 2025-02-04 | End: 2026-02-04

## 2025-02-04 NOTE — PROGRESS NOTES
Subjective   Patient ID: Tevin Costello is a 54 y.o. male who presents for No chief complaint on file..    HPI follow-up visit no chest pain no shortness of breath no side effect with doing diet and exercise for the blood pressure reviewed low blood sugar no polyuria polydipsia no hypoglycemia    Review of Systems    Objective   There were no vitals taken for this visit.    Physical Exam vital signs noted alert and oriented x 3 NCAT no JVD chest clear to auscultation CV regular rate and rhythm S1-S2 no murmur gallop or rub abdomen obese soft nontender normal active bowel sounds extremities no clubbing cyanosis or edema normal distal pulses    Assessment/Plan impression hypertension diabetes mellitus  Plan still awaiting prior blood work advised on review on the medicine has been doing well further diet weight loss exercise increase water consumption discussed with prior blood work has follow-up with the pharmacy team regarding the medications reviewed prescription and medication update and reconciliation discussed with blood pressure management and medication   current blood pressure readings are high normal continue to watch salt in the diet and recheck 3 months based on above

## 2025-02-05 ENCOUNTER — PHARMACY VISIT (OUTPATIENT)
Dept: PHARMACY | Facility: CLINIC | Age: 55
End: 2025-02-05
Payer: COMMERCIAL

## 2025-02-05 PROCEDURE — RXMED WILLOW AMBULATORY MEDICATION CHARGE

## 2025-02-08 DIAGNOSIS — Z00.00 HEALTH CARE MAINTENANCE: ICD-10-CM

## 2025-02-10 RX ORDER — MONTELUKAST SODIUM 10 MG/1
10 TABLET ORAL NIGHTLY
Qty: 90 TABLET | Refills: 0 | Status: SHIPPED | OUTPATIENT
Start: 2025-02-10

## 2025-02-16 PROCEDURE — RXMED WILLOW AMBULATORY MEDICATION CHARGE

## 2025-02-19 PROCEDURE — RXMED WILLOW AMBULATORY MEDICATION CHARGE

## 2025-02-20 ENCOUNTER — PHARMACY VISIT (OUTPATIENT)
Dept: PHARMACY | Facility: CLINIC | Age: 55
End: 2025-02-20
Payer: COMMERCIAL

## 2025-02-24 NOTE — PROGRESS NOTES
Patient: Tevin Costello    [id number]  : 1970 -- AGE 54 y.o.    Provider: Laurel Crabtree MD     Location [facility]   Service Date: 2025              [facility]  Followup Visit Note      HISTORY OF PRESENT ILLNESS     The patient's referring provider is: Morales Clinton MD     HISTORY OF PRESENT ILLNESS   Tevin Costlelo is a 54 y.o. male with past medical history significant for ANABEL, HTN, HLD, ADHD, DM type 2, hx of COVID in 2021 with hospitalization who presents to a [facility] for followup.       PAST SLEEP HISTORY  He had a sleep study on 2018 which showed an AHI of 50 events/hour with an SPO2 odin of 68%. He had a CPAP titration on 2018 at a BMI of 40 which showed a residual AHI of 1.8 events/hour auto CPAP of 13 but with elevated PLM index of 38 events/hour. He has been on APAP 8-14. He had a pulse ox study on CPAP in 3/2021 with no significant residual hypoxemia. He is using oxygen at 4lpm with his APAP. He has been a good user of CPAP his DME is [employer]. He has been using a full facemask with a CPAP device. He has a Dreamstation since 2018, but now is on a Resmed AirSense 11.  He uses magneisum, calcium, vit D3 and sleeps 6-8 per night.       He had a nasal surgery in the past. He is on Concerta 36 for ADD but also helps him stay awake.     CURRENT HISTORY  At time of his last visit he was using CPAP. Continued use was recommended. He had transitioned to Concerta for his ADD.     On today's visit, no new sleep complaints. Doing well on PAP therapy. Tolerating it well. He comes to the visit with his wife.    No EDS. On Concerta ER 36 mg in AM.    He is on Mounjaro and has lost about 30# so far.    Sleep schedule:  In bed:1AM (sometimes 2AM) as he spends lot of time on phone but trying to go earlier to 11:30 pm  Activities in bed: no  Lights out: < 15  min  Subjective sleep latency: no  Awakenings during night: wake  Length of awakenings: 15minutes  Final awakening time: 7:30 am  Overall estimate of total sleep time: 7hrs     On weekends: similar    Preferred sleeping position: supine and sidelying  Typically sleeps with his wife.     PAP Adherence:  DURABLE MEDICAL EQUIPMENT COMPANY: [employer]  Machine: THERAPY: RESMED AIRSENSE 11 PRESSURE SETTINGS: 5 - 15 cm H2O  Mask: MASK TYPE: FULL FACE MASK - F&P Simplus - medium.  Issues with therapy: ISSUES WITH THERAPY: none  Benefits with PAP: PERCEIVED BENEFITS OF PAP: refreshing sleep reduced daytime sleepiness    Daytime Symptoms  On awakening patient reports: waking refreshed    Daytime: During the day, he does not doze unintentionally while inactive.  During more active tasks, he rarely is drowsy.  With regards to daytime napping, the patient reports rarely taking naps. If he takes a nap, typically he awakens refreshed.  He does not report that poor sleep results in mood-related irritability. He does not report that poor sleep results in issues with memory/concentration.    ESS: 0  QING: 1      REVIEW OF SYSTEMS     REVIEW OF SYSTEMS  Review of Systems   All other systems reviewed and are negative.      ALLERGIES AND MEDICATIONS     ALLERGIES  Allergies   Allergen Reactions    Nsaids (Non-Steroidal Anti-Inflammatory Drug) Anaphylaxis    Aspirin Swelling    Bee Pollen Unknown    Milk Containing Products (Dairy) Unknown    Mold Unknown    Penicillin Swelling       MEDICATIONS: He has a current medication list which includes the following prescription(s): amlodipine - TAKE 1 TABLET BY MOUTH ONCE DAILY, freestyle brandon 3 plus sensor - Replace every 15 days for testing blood sugars, losartan-hydrochlorothiazide - TAKE 2 TABLETS BY MOUTH ONCE DAILY, metformin xr - Take 2 tablets (1,000 mg) by mouth once daily with breakfast. Do not crush, chew, or split, methylphenidate er - Take 1 tablet by mouth daily,  montelukast - TAKE 1 TABLET BY MOUTH EVERYDAY AT BEDTIME, and mounjaro - Inject 12.5 mg under the skin 1 (one) time per week.    PAST MEDICAL HISTORY : He has Attention deficit hyperactivity disorder (ADHD), combined type; Benign essential hypertension; Chronic fatigue syndrome; Chronic left-sided low back pain without sciatica; Dyslipidemia; History of bariatric surgery; Obesity; Obstructive sleep apnea syndrome; Type 2 diabetes mellitus without complication, without long-term current use of insulin (Multi); Age-related nuclear cataract of both eyes; and Refraction error on their problem list.    PAST SURGICAL HISTORY: He  has a past surgical history that includes Tonsillectomy (03/15/2016) and Tonsillectomy (09/15/2017).     FAMILY HISTORY: No changes since previous visit. Otherwise non-contributory as charted.     SOCIAL HISTORY  He  reports that he has never smoked. He has never used smokeless tobacco. He reports current alcohol use. He reports that he does not use drugs. .      PHYSICAL EXAM     Physical Examination: /81   Pulse 79   Temp 36.5 °C (97.7 °F) (Temporal)   Wt 137 kg (302 lb 1.3 oz)   SpO2 93%   BMI 42.13 kg/m²     PREVIOUS WEIGHTS:  Wt Readings from Last 3 Encounters:   02/25/25 137 kg (302 lb 1.3 oz)   02/04/25 139 kg (307 lb)   10/02/24 147 kg (325 lb)       General: The patient is a pleasant male, in no acute distress. HEENT: He has a  a modified Mallampati grade 3 airway with Numbers; 0-4 (with +): No tonsils bilaterally. The soft palate was normal and the uvula was thin short. The A/P diameter of the velopharynx was not narrowed. The oropharynx was not shallow in the A/P diameter. Lateral wall narrowing was not present. Tongue ridging waspresent. Erythema of the posterior pharynx was not present. Mucosal hypertrophy in the posterior oropharynx was not present. Retrognathia was not and micrognathia was not present. Neck: The neck was not enlarged. No JVP, bruits or  "lymphadenopathy was appreciated. Chest: Clear to auscultation. No wheezes, rales, or rhonchi. Cardiovascular: Regular rate and rhythm. No murmurs, gallops, or clicks. Abdomen: Soft, nontender, nondistended. Positive bowel sounds. Extremities: No clubbing, cyanosis, or edema is noted. Neurologic exam: Alert, oriented x3 and was grossly non-focal.      RESULTS/DATA     Ferritin (ug/L)   Date Value   10/25/2018 216       Bicarbonate (mmol/L)   Date Value   08/27/2024 25   05/07/2024 30   01/28/2022 29   01/15/2021 26   01/14/2021 26       PAP Adherence  A PAP adherence download was obtained and data was reviewed personally today in clinic. (see scanned document in EPIC)      DIAGNOSES     Problem List and Orders  Diagnoses and all orders for this visit:  ANABEL on CPAP  -     Follow Up In Adult Sleep Medicine; Future  -     Positive Airway Pressure (PAP) Therapy  Class 3 severe obesity due to excess calories with serious comorbidity and body mass index (BMI) of 40.0 to 44.9 in adult          ASSESSMENT/PLAN     Mr. Costello is a 54 y.o. male and with past medical history significant for ANABEL, HTN, HLD, ADHD, glucose intolerante, hx of COVID in January 2021 with hospitalization. He returns in followup to  the [facility] for their ANABEL.    ANABEL on PAP: Overall, Mr. Costello is doing well with the use of PAP therapy for his ANABEL. Compared to before starting PAP, he continues to use and benefit from use of the PAP device and has observed improvements in sleep quality and daytime function. Specifically, his residual daytime sleepiness or fatigue has significantly improved. Thus, continued use of PAP was recommended and to use for the entire sleep period. On PAP, he doesn't snore, have witnessed apneas, gasp for air, or kick/thrash. Objective compliance data also suggest excellent efficacy in the home setting. At a minimum he should use PAP for at least 4 hours/night, however, \"all night, every " "night\" is best. In addition, PAP use during naps are also encouraged. Mr. Costello was advised to contact DME to obtain replenishment supplies for him PAP every 3 months or as needed or for other equipment specific issues.  Plan  - Will Change pressure to 8-16 cm H2O from 8-14 cm H2O. (As PAP download today shows Max pressure 13.6 cm H2O)  - New supplies ordered today.    Obesity.  The patient was counseled that his weight is the strongest modifiable risk factor and contributor for ANABEL. He was counseled to consider weight loss options to include changes in dietary habits and activity. We briefly discussed the use of calorie tracking smartphone apps and the use of activity meters to provide feedback that helps in losing weight.  Before initiating an exercise plan, he should carefully review the approach with his primary care provider.     ADD. On Concerta 36 mg ER in morning. This seems to help his alterness as well.    Follow up: 12 months.     Patient staffed with Dr Butler.    Laurel Crabtree MD   Sleep Medicine Fellow PGY4        ----------------------------------------------------------------------------------------------------------------------  2/25/2025  2:30 PM ::     Written by Abdiel Butler MD, PhD    Briefly, Mr. Costello, a 54 y.o. male, was evaluated at the [facility] for his ANABEL.     I personally saw and evaluated the patient. I discussed the patient with the Fellow and agree with their note which accurately reflects my own findings and plan. In summary, he uses CPAP regularly for his ANABEL. He is using and benefitting. He continues to use weight with a GLP1 agonist/GIP medication. When his weight loss plateaus we might want to repeat sleep testing. He feels more refreshed on BPAP. He is using a FFM without issues except some occasional dryness which is present more when he mouth breathes. APAP range adjusted to 8-16 cwp. Followup in 1 year.    Please refer to the full " clinic note for specific details.    Abdiel Butler MD, PhD  Division of Pulmonary, Critical Care, and Sleep Medicine  Clinical Associate Professor of Medicine, [facility]  , Sleep Medicine  System Director,  Sleep Medicine    ----------------------------------------------------------------------------------------------------------------------

## 2025-02-25 ENCOUNTER — PHARMACY VISIT (OUTPATIENT)
Dept: PHARMACY | Facility: CLINIC | Age: 55
End: 2025-02-25
Payer: COMMERCIAL

## 2025-02-25 ENCOUNTER — OFFICE VISIT (OUTPATIENT)
Dept: SLEEP MEDICINE | Facility: HOSPITAL | Age: 55
End: 2025-02-25
Payer: COMMERCIAL

## 2025-02-25 VITALS
OXYGEN SATURATION: 93 % | HEART RATE: 79 BPM | WEIGHT: 302.08 LBS | SYSTOLIC BLOOD PRESSURE: 119 MMHG | BODY MASS INDEX: 42.13 KG/M2 | DIASTOLIC BLOOD PRESSURE: 81 MMHG | TEMPERATURE: 97.7 F

## 2025-02-25 DIAGNOSIS — G47.33 OSA ON CPAP: Primary | ICD-10-CM

## 2025-02-25 DIAGNOSIS — E66.01 CLASS 3 SEVERE OBESITY DUE TO EXCESS CALORIES WITH SERIOUS COMORBIDITY AND BODY MASS INDEX (BMI) OF 40.0 TO 44.9 IN ADULT: ICD-10-CM

## 2025-02-25 DIAGNOSIS — E66.813 CLASS 3 SEVERE OBESITY DUE TO EXCESS CALORIES WITH SERIOUS COMORBIDITY AND BODY MASS INDEX (BMI) OF 40.0 TO 44.9 IN ADULT: ICD-10-CM

## 2025-02-25 PROCEDURE — RXMED WILLOW AMBULATORY MEDICATION CHARGE

## 2025-02-25 PROCEDURE — 3079F DIAST BP 80-89 MM HG: CPT | Performed by: INTERNAL MEDICINE

## 2025-02-25 PROCEDURE — 1036F TOBACCO NON-USER: CPT | Performed by: INTERNAL MEDICINE

## 2025-02-25 PROCEDURE — 3074F SYST BP LT 130 MM HG: CPT | Performed by: INTERNAL MEDICINE

## 2025-02-25 PROCEDURE — 99214 OFFICE O/P EST MOD 30 MIN: CPT | Performed by: INTERNAL MEDICINE

## 2025-02-25 PROCEDURE — 99214 OFFICE O/P EST MOD 30 MIN: CPT | Mod: GC | Performed by: INTERNAL MEDICINE

## 2025-02-25 RX ORDER — METHYLPHENIDATE HYDROCHLORIDE 36 MG/1
36 TABLET ORAL DAILY
Qty: 30 TABLET | Refills: 0 | OUTPATIENT
Start: 2025-02-25

## 2025-02-25 ASSESSMENT — PAIN SCALES - GENERAL: PAINLEVEL_OUTOF10: 0-NO PAIN

## 2025-02-25 NOTE — PATIENT INSTRUCTIONS
[facility] Sleep Medicine  [facility] [place]  [address]  [facility] [address] [phone]    [facility] SLEEP MED VIRTUAL  [address] [place] [id number] [phone]  [facility] [place]  [address]  [facility] 6TH FLOOR  [facility] [address]           NAME: Tevin Costello   DATE: 2/25/2025     Your Sleep Provider Today: Abdiel Butler MD PhD  Your Primary Care Physician: Morales Clinton MD   Your Referring Provider: No ref. provider found    Thank you for coming to the Sleep Medicine Clinic today! Your sleep medicine provider today was: Abdiel Butler MD PhD Below is a summary of your treatment plan, other important information, and our contact numbers:  If you need to schedule an appointment, please call [phone])  If you need general assistance (e.g. forms completed, general questions), please call my , Jen, at [phone].  If you have a medical question about your sleep issues, please contact our nurses, Jacki or Jerri at [phone].   You can also contact us through [employer].      DIAGNOSIS:   1. ANABEL on CPAP  Positive Airway Pressure (PAP) Therapy      2. Morbid obesity with BMI of 40.0-44.9, adult (Multi)                TREATMENT PLAN     We changed pressure to 8-16 cm H2O today on your CPAP settings and ordered new supplies.    Instructions - Common ANABEL Recs: - For your sleep apnea, continue to use your PAP every night and use it whenever you are sleeping.   - Avoid alcohol or sedatives several hours prior to sleeping.   - Get additional supplies for your PAP (e.g., mask, hose, filters) every 3 months or as your insurance allows from your [employer] company. Replacement cushions for your PAP mask can be requested monthly if airseals are an issue.  - Remember to clean your mask, tubings, and water chamber regularly as instructed.  - Avoid driving or  operating heavy machinery when drowsy. A person driving while sleepy is five (5) times more likely to have an accident. If you feel sleepy, pull over and take a short power nap (sleep for less than 30 minutes). Otherwise, ask somebody to drive you.      Follow-up Appointment:   Followup with me in 12 months.      IMPORTANT INFORMATION     Call 911 for medical emergencies.  Our offices are generally open from Monday-Friday, 9 am - 5 pm.  If you need to get in touch with me, you may either call me and my team(number is below) or you can use [employer].  If a referral for a test, for CPAP, or for another specialist was made, and you have not heard about scheduling this within a week, please call scheduling at [phone]).  If you are unable to make your appointment for clinic or an overnight study, kindly call the office at least 48 hours in advance to cancel and reschedule.  If you are on CPAP, please bring your device's card or the device to each clinic appointment.   There are no supporting services by either the sleep doctors or their staff on weekends and Holidays, or after 5 PM on weekdays.   If you have been asked to come to a sleep study, make sure you bring toiletries, a comfy pillow, and any nighttime medications that you may regularly take. Also be sure to eat dinner before you arrive. We generally do not provide meals.      PRESCRIPTIONS     We require 7 days advanced notice for prescription refills. If we do not receive the request in this time, we cannot guarantee that your medication will be refilled in time.      IMPORTANT PHONE NUMBERS      scheduling for medical testin139 - [phone]   Sleep Medicine Clinic Fax: [phone]  Appointments (for Pediatric Sleep Clinic): [phone] - option 1  Appointments (for Adult Sleep Clinic): [phone] - option 2  Appointments (For Sleep Studies): [phone] - option 3  Financial Assistance Program: Call [phone]  (For [facility] services: call [phone])  Website:  Financial Assistance  Behavioral Sleep Medicine: [phone]  Bariatric Surgery: [phone] ( Bariatric Surgery Website)   Sleep Surgery: [phone]  ENT (Otolaryngology): [phone]  Myofunctional Therapy (ENT): STEFFI Gil, Mc Donnelly, David Gonzalez; [phone]   Song Rajan; [phone]  Zack Catherine; [phone]  [facility]; [phone]  Margot [facility] [phone] (option 1)  Headache Clinic (Neurology): [phone]  Neurology: [phone]  Psychiatry: [phone]  Pulmonary Function Testing (PFT) Center: [phone]  Pulmonary Medicine: [phone]  [employer] (DME): [phone]  [employer] (DME): [phone]  [facility] (DME): [phone]      COMMON PROVIDERS WE REFER TO     For Weight Loss - Dr. Shari Cam - Call [phone]  For Sleep Surgery - Dr. Wilian Jones - Call [phone]      OUR ADULT SLEEP MEDICINE TEAM   Please do not hesitate to call the office or sleep nurse with any questions between appointments:    Adult Sleep Nurses (Jerri Garibay, RN and Dianelys Miguel RN):  For clinical questions and refilling prescriptions: [phone]  Email sleep diaries and other documents at: [email or web address]    Adult Sleep Medicine Secretaries:  Zina Araiza (For Calixto/Morris/Krise/Strohl/Yeh/Umaña):   P: [phone]  F: [phone]  Jen Benedict (For Butler/Guggenbiller): P: [phone]  Fax: [phone]  Roseline Islas (For Jurcevic/Blank): P: [phone]  F: [phone]  Marizol Hernandes (For Narayan): P: [phone]  F: [phone]  Kavitha Henderson (For Michelle/Ish/Patrice): P: [phone]  F: [phone]  Abiola Bean (For Skyler/Deni): P: [phone]  F: [phone]     Adult Sleep Medicine Advanced Practice Providers:  Benjamin Parsons ([place], [place])  Kayleen Deng ([facility]  "[place])  Marcia Lentz CNP ([place], [place], [place])  Lily Alvarez CNP ([place], [place], [place])  Alicja Cheema CNP ([place], [place])        OUR SLEEP TESTING LOCATIONS     Our team will contact you to schedule your sleep study, however, you can contact us as follow:  Main Phone Line (scheduling only): [phone]), option 3  Adult and Pediatric Locations   [place] (6 years and older): [facility] - 4th floor ([address] [facility]) After hours line: [phone]  [facility] (Main campus: All ages): [facility], 6th floor. After hours line: [phone]   [place] (5 years and older; younger considered on case-by-case basis): [address], [address]. Scheduling  After hours line: [phone]   [place] (6 years and older): [address]; [address]   [place] (6 years and older): [address] [facility], [address]  After hours line: [phone]   [place] (13 years and older) in [place]: [address], 2nd floor  After hours line: [phone]   [place] (13 year and older): [address]  After hours line: [phone]     Adult Only Locations:   [place] (18 years and older): [address] [place] [address], 2nd floor   [place] (18 years and older): [address] [facility]; 4th floor  After hours line: [phone]   [place] (18 years and older) at [place]: [address] [facility]  After hours line: [phone]          CONTACTING YOUR SLEEP MEDICINE PROVIDER     Send a message directly to your provider through \"My Chart\", which is the email service through your  Records Account: [email or web address]   Call [phone] and leave a message. One of the administrative assistants will forward the message to your sleep medicine provider through \"My Chart\" and/or email.     Your sleep medicine provider for this visit was: Abdiel RODRÍGUEZ" MD Carrie PhD

## 2025-02-26 LAB
ALBUMIN SERPL-MCNC: 4.6 G/DL (ref 3.6–5.1)
ALP SERPL-CCNC: 70 U/L (ref 35–144)
ALT SERPL-CCNC: 19 U/L (ref 9–46)
ANION GAP SERPL CALCULATED.4IONS-SCNC: 8 MMOL/L (CALC) (ref 7–17)
AST SERPL-CCNC: 19 U/L (ref 10–35)
BILIRUB SERPL-MCNC: 0.5 MG/DL (ref 0.2–1.2)
BUN SERPL-MCNC: 16 MG/DL (ref 7–25)
CALCIUM SERPL-MCNC: 9.9 MG/DL (ref 8.6–10.3)
CHLORIDE SERPL-SCNC: 105 MMOL/L (ref 98–110)
CHOLEST SERPL-MCNC: 218 MG/DL
CHOLEST/HDLC SERPL: 6.6 (CALC)
CO2 SERPL-SCNC: 29 MMOL/L (ref 20–32)
CREAT SERPL-MCNC: 0.89 MG/DL (ref 0.7–1.3)
EGFRCR SERPLBLD CKD-EPI 2021: 101 ML/MIN/1.73M2
EST. AVERAGE GLUCOSE BLD GHB EST-MCNC: 108 MG/DL
EST. AVERAGE GLUCOSE BLD GHB EST-SCNC: 6 MMOL/L
GLUCOSE SERPL-MCNC: 94 MG/DL (ref 65–99)
HBA1C MFR BLD: 5.4 % OF TOTAL HGB
HDLC SERPL-MCNC: 33 MG/DL
LDLC SERPL CALC-MCNC: 146 MG/DL (CALC)
NONHDLC SERPL-MCNC: 185 MG/DL (CALC)
POTASSIUM SERPL-SCNC: 4.2 MMOL/L (ref 3.5–5.3)
PROT SERPL-MCNC: 7.7 G/DL (ref 6.1–8.1)
SODIUM SERPL-SCNC: 142 MMOL/L (ref 135–146)
TRIGL SERPL-MCNC: 256 MG/DL

## 2025-02-28 ENCOUNTER — PHARMACY VISIT (OUTPATIENT)
Dept: PHARMACY | Facility: CLINIC | Age: 55
End: 2025-02-28
Payer: COMMERCIAL

## 2025-03-20 ENCOUNTER — APPOINTMENT (OUTPATIENT)
Dept: PHARMACY | Facility: HOSPITAL | Age: 55
End: 2025-03-20
Payer: COMMERCIAL

## 2025-03-20 DIAGNOSIS — E11.9 TYPE 2 DIABETES MELLITUS WITHOUT COMPLICATION, WITHOUT LONG-TERM CURRENT USE OF INSULIN (MULTI): ICD-10-CM

## 2025-03-20 PROCEDURE — RXMED WILLOW AMBULATORY MEDICATION CHARGE

## 2025-03-20 RX ORDER — METFORMIN HYDROCHLORIDE 500 MG/1
1000 TABLET, EXTENDED RELEASE ORAL
Qty: 180 TABLET | Refills: 3 | Status: SHIPPED | OUTPATIENT
Start: 2025-03-20 | End: 2026-03-15

## 2025-03-20 ASSESSMENT — ENCOUNTER SYMPTOMS: POLYDIPSIA: 0

## 2025-03-20 NOTE — PROGRESS NOTES
[facility] (VBID)    Tevin Costello is a 54 y.o. male was referred to Clinical Pharmacy Team to complete a comprehensive medication review (CMR) with a pharmacist as part of the Value Based Insurance Design diabetes program.  Pharmacy team may also provide assistance in diabetes management per discussion with referring provider and/or endocrinology.    Referring Provider: Morales Clinton MD  Does patient follow with Endocrinology: No  Endocrinology Provider Name: N/A    Subjective   Allergies   Allergen Reactions    Nsaids (Non-Steroidal Anti-Inflammatory Drug) Anaphylaxis    Aspirin Swelling    Bee Pollen Unknown    Milk Containing Products (Dairy) Unknown    Mold Unknown    Penicillin Swelling       [facility]  [address], [address]  [facility] [address]  Phone: [phone] Fax: [phone]    [facility] [address] [place] [facility] [address] [facility]  [address] [facility] [address]  Phone: [phone] Fax: [phone]    [facility] [address] [facility]  [address] [facility]  [address]  [facility] [address]  Phone: [phone] Fax: [phone]      Diabetes  He presents for his follow-up diabetic visit. He has type 2 diabetes mellitus. His disease course has been improving. There are no hypoglycemic associated symptoms. Pertinent negatives for diabetes include no polydipsia and no polyuria. There are no hypoglycemic complications. Symptoms are improving. Risk factors for coronary artery disease include diabetes mellitus, male sex and obesity. Current diabetic treatments: Metformin  mg two tabs daily; Mounjaro 12.5 mg weekly, He is compliant with treatment all of the time. There is no change in his home blood glucose trend. His breakfast blood glucose range is generally  mg/dl. His overall blood glucose range is  mg/dl.       Objective     There were no vitals taken for this visit.     LAB  Lab  "Results   Component Value Date    BILITOT 0.5 02/25/2025    CALCIUM 9.9 02/25/2025    CO2 29 02/25/2025     02/25/2025    CREATININE 0.89 02/25/2025    GLUCOSE 94 02/25/2025    ALKPHOS 70 02/25/2025    K 4.2 02/25/2025    PROT 7.7 02/25/2025     02/25/2025    AST 19 02/25/2025    ALT 19 02/25/2025    BUN 16 02/25/2025    ANIONGAP 8 02/25/2025    MG 2.10 01/11/2021    ALBUMIN 4.6 02/25/2025    LIPASE 31 08/27/2024    GFRMALE >90 01/28/2022     Lab Results   Component Value Date    TRIG 256 (H) 02/25/2025    CHOL 218 (H) 02/25/2025    LDLCALC 146 (H) 02/25/2025    HDL 33 (L) 02/25/2025     Lab Results   Component Value Date    HGBA1C 5.4 02/25/2025     Estimated body mass index is 42.13 kg/m² as calculated from the following:    Height as of 10/1/24: 1.803 m (5' 11\").    Weight as of 2/25/25: 137 kg (302 lb 1.3 oz).     Current Outpatient Medications on File Prior to Visit   Medication Sig Dispense Refill    amLODIPine (Norvasc) 5 mg tablet TAKE 1 TABLET BY MOUTH ONCE DAILY 90 tablet 0    blood-glucose sensor (FreeStyle Britta 3 Plus Sensor) device Replace every 15 days for testing blood sugars 2 each 11    losartan-hydrochlorothiazide (Hyzaar) 50-12.5 mg tablet TAKE 2 TABLETS BY MOUTH ONCE DAILY 180 tablet 0    metFORMIN XR (Glucophage-XR) 500 mg 24 hr tablet Take 2 tablets (1,000 mg) by mouth once daily with breakfast. Do not crush, chew, or split. 60 tablet 1    methylphenidate ER (Concerta) 36 mg extended release tablet Take 1 tablet by mouth daily 30 tablet 0    montelukast (Singulair) 10 mg tablet TAKE 1 TABLET BY MOUTH EVERYDAY AT BEDTIME 90 tablet 0    tirzepatide (Mounjaro) 12.5 mg/0.5 mL pen injector Inject 12.5 mg under the skin 1 (one) time per week. 2 mL 2     No current facility-administered medications on file prior to visit.        HISTORICAL PHARMACOTHERAPY (if relevant)  -N/A    Secondary Prevention:  Statin? No  ACE-I/ARB? Yes    Pertinent PMH Review:  PMH of Pancreatitis: No  PMH of " Retinopathy: No  PMH of Urinary Tract Infections: No  PMH of MTC: No    SMBG:  - FBG     ASSESSMENT/PLAN:  - Since last visit, patient remains well controlled from a symptom and BG standpoint after continuing Mounjaro 12.5 mg. His most recent A1C was 5.4% and his weight has continued to decline to 293 lbs. Given progress, we will continue current doses for 1 month before assessing need for titration  - Increase to Mounjaro 12.5 mg once weekly.  - Continue Metformin 500 mg ER two tabs daily     Employee Health Diabetes Program (VBID)  - Patient enrolled in  Employee diabetes program for $0 co-pays on diabetes medications/supplies. Enrollment should be active in 2-4 weeks.  - Requested VBID enrollment date: 8/30/24  - PharmD Management Level: 4  -  Pharmacy fill location: [facility]    Assessment/Plan   Problem List Items Addressed This Visit       Type 2 diabetes mellitus without complication, without long-term current use of insulin (Multi)     - Continue Mounjaro 12.5 mg once weekly  - Continue Metformin 500 mg ER twice daily.              Follow up: 4/17    Continue all meds under the continuation of care with the referring provider and clinical pharmacy team.    Clay Nair, PharmD     Verbal consent to manage patient's drug therapy was obtained from [the patient and/or an individual authorized to act on behalf of a patient]. They were informed they may decline to participate or withdraw from participation in pharmacy services at any time.

## 2025-03-22 ENCOUNTER — PHARMACY VISIT (OUTPATIENT)
Dept: PHARMACY | Facility: CLINIC | Age: 55
End: 2025-03-22
Payer: COMMERCIAL

## 2025-03-30 PROCEDURE — RXMED WILLOW AMBULATORY MEDICATION CHARGE

## 2025-04-01 ENCOUNTER — PHARMACY VISIT (OUTPATIENT)
Dept: PHARMACY | Facility: CLINIC | Age: 55
End: 2025-04-01
Payer: COMMERCIAL

## 2025-04-01 PROCEDURE — RXMED WILLOW AMBULATORY MEDICATION CHARGE

## 2025-04-01 RX ORDER — METHYLPHENIDATE HYDROCHLORIDE 36 MG/1
36 TABLET ORAL DAILY
Qty: 30 TABLET | Refills: 0 | OUTPATIENT
Start: 2025-04-01

## 2025-04-15 DIAGNOSIS — I10 BENIGN ESSENTIAL HYPERTENSION: ICD-10-CM

## 2025-04-17 ENCOUNTER — APPOINTMENT (OUTPATIENT)
Dept: PHARMACY | Facility: HOSPITAL | Age: 55
End: 2025-04-17
Payer: COMMERCIAL

## 2025-04-17 RX ORDER — AMLODIPINE BESYLATE 5 MG/1
5 TABLET ORAL DAILY
Qty: 90 TABLET | Refills: 0 | Status: SHIPPED | OUTPATIENT
Start: 2025-04-17 | End: 2026-04-17

## 2025-04-17 RX ORDER — LOSARTAN POTASSIUM AND HYDROCHLOROTHIAZIDE 12.5; 5 MG/1; MG/1
2 TABLET ORAL DAILY
Qty: 180 TABLET | Refills: 0 | Status: SHIPPED | OUTPATIENT
Start: 2025-04-17 | End: 2026-04-17

## 2025-04-18 PROCEDURE — RXMED WILLOW AMBULATORY MEDICATION CHARGE

## 2025-04-21 ENCOUNTER — PHARMACY VISIT (OUTPATIENT)
Dept: PHARMACY | Facility: CLINIC | Age: 55
End: 2025-04-21
Payer: COMMERCIAL

## 2025-04-28 ENCOUNTER — PHARMACY VISIT (OUTPATIENT)
Dept: PHARMACY | Facility: CLINIC | Age: 55
End: 2025-04-28
Payer: COMMERCIAL

## 2025-04-28 ENCOUNTER — APPOINTMENT (OUTPATIENT)
Dept: PHARMACY | Facility: HOSPITAL | Age: 55
End: 2025-04-28
Payer: COMMERCIAL

## 2025-04-28 DIAGNOSIS — E11.9 TYPE 2 DIABETES MELLITUS WITHOUT COMPLICATION, WITHOUT LONG-TERM CURRENT USE OF INSULIN: ICD-10-CM

## 2025-04-28 PROCEDURE — RXMED WILLOW AMBULATORY MEDICATION CHARGE

## 2025-04-28 RX ORDER — TIRZEPATIDE 15 MG/.5ML
15 INJECTION, SOLUTION SUBCUTANEOUS WEEKLY
Qty: 6 ML | Refills: 3 | Status: SHIPPED | OUTPATIENT
Start: 2025-04-28

## 2025-04-28 ASSESSMENT — ENCOUNTER SYMPTOMS: POLYDIPSIA: 0

## 2025-04-28 NOTE — PROGRESS NOTES
[facility] (VBID)    Tevin Costello is a 54 y.o. male was referred to Clinical Pharmacy Team to complete a comprehensive medication review (CMR) with a pharmacist as part of the Value Based Insurance Design diabetes program.  Pharmacy team may also provide assistance in diabetes management per discussion with referring provider and/or endocrinology.    Referring Provider: Morales Clinton MD  Does patient follow with Endocrinology: No  Endocrinology Provider Name: N/A    Subjective   Allergies   Allergen Reactions   • Nsaids (Non-Steroidal Anti-Inflammatory Drug) Anaphylaxis   • Aspirin Swelling   • Bee Pollen Unknown   • Milk Containing Products (Dairy) Unknown   • Mold Unknown   • Penicillin Swelling       [facility]  [address], [address]  [facility] [address]  Phone: [phone] Fax: [phone]    [facility] [address] [place] [facility] [address] [facility]  [address] [facility] [address]  Phone: [phone] Fax: [phone]    [facility] [address] [address]  [address] [place] [address]  Phone: [phone] Fax: [phone]      Diabetes  He presents for his follow-up diabetic visit. He has type 2 diabetes mellitus. His disease course has been improving. There are no hypoglycemic associated symptoms. Pertinent negatives for diabetes include no polydipsia and no polyuria. There are no hypoglycemic complications. Symptoms are improving. Risk factors for coronary artery disease include diabetes mellitus, male sex and obesity. Current diabetic treatments: Metformin  mg two tabs daily; Mounjaro 12.5 mg weekly, He is compliant with treatment all of the time. There is no change in his home blood glucose trend. His breakfast blood glucose range is generally  mg/dl. His overall blood glucose range is  mg/dl.       Objective     There were no vitals taken for this visit.  "    LAB  Lab Results   Component Value Date    BILITOT 0.5 02/25/2025    CALCIUM 9.9 02/25/2025    CO2 29 02/25/2025     02/25/2025    CREATININE 0.89 02/25/2025    GLUCOSE 94 02/25/2025    ALKPHOS 70 02/25/2025    K 4.2 02/25/2025    PROT 7.7 02/25/2025     02/25/2025    AST 19 02/25/2025    ALT 19 02/25/2025    BUN 16 02/25/2025    ANIONGAP 8 02/25/2025    MG 2.10 01/11/2021    ALBUMIN 4.6 02/25/2025    LIPASE 31 08/27/2024    GFRMALE >90 01/28/2022     Lab Results   Component Value Date    TRIG 256 (H) 02/25/2025    CHOL 218 (H) 02/25/2025    LDLCALC 146 (H) 02/25/2025    HDL 33 (L) 02/25/2025     Lab Results   Component Value Date    HGBA1C 5.4 02/25/2025     Estimated body mass index is 42.13 kg/m² as calculated from the following:    Height as of 10/1/24: 1.803 m (5' 11\").    Weight as of 2/25/25: 137 kg (302 lb 1.3 oz).     Current Outpatient Medications on File Prior to Visit   Medication Sig Dispense Refill   • amLODIPine (Norvasc) 5 mg tablet TAKE 1 TABLET BY MOUTH ONCE DAILY 90 tablet 0   • blood-glucose sensor (FreeStyle Britta 3 Plus Sensor) device Replace every 15 days for testing blood sugars 2 each 11   • losartan-hydrochlorothiazide (Hyzaar) 50-12.5 mg tablet TAKE 2 TABLETS BY MOUTH ONCE DAILY 180 tablet 0   • metFORMIN XR (Glucophage-XR) 500 mg 24 hr tablet Take 2 tablets (1,000 mg) by mouth once daily with breakfast. Do not crush, chew, or split. 180 tablet 3   • methylphenidate ER (Concerta) 36 mg extended release tablet Take 1 tablet by mouth daily 30 tablet 0   • montelukast (Singulair) 10 mg tablet TAKE 1 TABLET BY MOUTH EVERYDAY AT BEDTIME 90 tablet 0   • tirzepatide (Mounjaro) 12.5 mg/0.5 mL pen injector Inject 12.5 mg under the skin 1 (one) time per week. 2 mL 2     No current facility-administered medications on file prior to visit.        HISTORICAL PHARMACOTHERAPY (if relevant)  -N/A    Secondary Prevention:  Statin? No  ACE-I/ARB? Yes    Pertinent PMH Review:  PMH of " Pancreatitis: No  PMH of Retinopathy: No  PMH of Urinary Tract Infections: No  PMH of MTC: No    SMBG:  - FBG     ASSESSMENT/PLAN:  - Since last visit, patient remains well controlled from a symptom and BG standpoint after continuing Mounjaro 12.5 mg. His most recent A1C was 5.4% and his weight has continued to decline to 293 lbs. Given progress and desire for additional weight loss, we will increase Mounjaro to 15 mg.  - Increase to Mounjaro 15 mg once weekly.  - Continue Metformin 500 mg ER two tabs daily     Employee Health Diabetes Program (VBID)  - Patient enrolled in  Employee diabetes program for $0 co-pays on diabetes medications/supplies. Enrollment should be active in 2-4 weeks.  - Requested VBID enrollment date: 8/30/24  - PharmD Management Level: 4  -  Pharmacy fill location: [facility]    Assessment/Plan   Problem List Items Addressed This Visit       Type 2 diabetes mellitus without complication, without long-term current use of insulin    - Increase to Mounjaro 15 mg once weekly.  - Continue Metformin 500 mg ER two tabs daily            Follow up: 3 months    Continue all meds under the continuation of care with the referring provider and clinical pharmacy team.    Clay Nair, PharmD     Verbal consent to manage patient's drug therapy was obtained from [the patient and/or an individual authorized to act on behalf of a patient]. They were informed they may decline to participate or withdraw from participation in pharmacy services at any time.

## 2025-05-02 PROCEDURE — RXMED WILLOW AMBULATORY MEDICATION CHARGE

## 2025-05-03 ENCOUNTER — PHARMACY VISIT (OUTPATIENT)
Dept: PHARMACY | Facility: CLINIC | Age: 55
End: 2025-05-03
Payer: COMMERCIAL

## 2025-05-13 DIAGNOSIS — Z00.00 HEALTH CARE MAINTENANCE: ICD-10-CM

## 2025-05-17 RX ORDER — MONTELUKAST SODIUM 10 MG/1
10 TABLET ORAL NIGHTLY
Qty: 90 TABLET | Refills: 0 | Status: SHIPPED | OUTPATIENT
Start: 2025-05-17

## 2025-06-05 ENCOUNTER — PHARMACY VISIT (OUTPATIENT)
Dept: PHARMACY | Facility: CLINIC | Age: 55
End: 2025-06-05
Payer: COMMERCIAL

## 2025-06-05 PROCEDURE — RXMED WILLOW AMBULATORY MEDICATION CHARGE

## 2025-06-17 PROCEDURE — RXMED WILLOW AMBULATORY MEDICATION CHARGE

## 2025-06-19 ENCOUNTER — PHARMACY VISIT (OUTPATIENT)
Dept: PHARMACY | Facility: CLINIC | Age: 55
End: 2025-06-19
Payer: COMMERCIAL

## 2025-07-10 ENCOUNTER — APPOINTMENT (OUTPATIENT)
Dept: PRIMARY CARE | Facility: CLINIC | Age: 55
End: 2025-07-10
Payer: COMMERCIAL

## 2025-07-10 ENCOUNTER — PHARMACY VISIT (OUTPATIENT)
Dept: PHARMACY | Facility: CLINIC | Age: 55
End: 2025-07-10
Payer: COMMERCIAL

## 2025-07-10 VITALS — SYSTOLIC BLOOD PRESSURE: 131 MMHG | WEIGHT: 285 LBS | DIASTOLIC BLOOD PRESSURE: 75 MMHG | BODY MASS INDEX: 39.75 KG/M2

## 2025-07-10 DIAGNOSIS — I10 PRIMARY HYPERTENSION: ICD-10-CM

## 2025-07-10 DIAGNOSIS — E78.2 MIXED HYPERLIPIDEMIA: ICD-10-CM

## 2025-07-10 DIAGNOSIS — Z00.00 HEALTH CARE MAINTENANCE: Primary | ICD-10-CM

## 2025-07-10 DIAGNOSIS — E11.9 TYPE 2 DIABETES MELLITUS WITHOUT COMPLICATION, WITHOUT LONG-TERM CURRENT USE OF INSULIN: ICD-10-CM

## 2025-07-10 DIAGNOSIS — G47.33 OSA ON CPAP: ICD-10-CM

## 2025-07-10 PROCEDURE — RXMED WILLOW AMBULATORY MEDICATION CHARGE

## 2025-07-10 PROCEDURE — 3078F DIAST BP <80 MM HG: CPT | Performed by: INTERNAL MEDICINE

## 2025-07-10 PROCEDURE — 3075F SYST BP GE 130 - 139MM HG: CPT | Performed by: INTERNAL MEDICINE

## 2025-07-10 PROCEDURE — 99396 PREV VISIT EST AGE 40-64: CPT | Performed by: INTERNAL MEDICINE

## 2025-07-10 RX ORDER — METHYLPHENIDATE HYDROCHLORIDE 36 MG/1
36 TABLET ORAL DAILY
Qty: 30 TABLET | Refills: 0 | OUTPATIENT
Start: 2025-07-10

## 2025-07-10 NOTE — PROGRESS NOTES
Subjective   Patient ID: Tevin Costello is a 54 y.o. male who presents for No chief complaint on file..    HPI CPE see updated Franchi no chest pain no shortness of breath no polyuria polydipsia has been doing okay with his sinuses and allergies has some irritation on his hands bowels normal no hypoglycemia no side effect with medication    Past medical history diabetes mellitus hypertension    Medications noted and unchanged including montelukast    Allergies noted and unchanged    Social history no tobacco    Family history noted and unchanged    Prevention discussed with prostate and colon screening some exercise not as much as he would like some prior blood work reviewed    Review of Systems    Objective   There were no vitals taken for this visit.    Physical Exam  Vital signs noted alert and oriented x 3 NCAT PERRLA EOMI nares without discharge OP benign TM normal bilateral EAC clear bilateral no AC nodes no JVD or bruit no thyromegaly chest clear to auscultation and percussion cor regular rate rhythm S1-S2 abdomen soft nontender normal active bowel sounds LS spine normal curvature negative straight leg raise extremities no clubbing cyanosis or edema normal distal pulses DTR 1+ skin hands some dermatitis  Assessment/Plan    impression General Medical examination diabetes mellitus hypertension other diagnoses (dee dee hyperlipidemia)  Plan advised on montelukast to [facility] pharmacy  Advised on losartan hydrochlorothiazide to [facility] pharmacy reviewed dosing 100/25 mg?  Advised on CA CS and advised on same  Referred to dermatology  Diet exercise weight loss especially exercise then recheck on colon and prostate screening check A1c advised on medication check PSA advised on prostate check lipid panel advised on cholesterol profile check CBC advised on blood count and recheck based on labs 3 months TT 50 cc 26

## 2025-07-11 LAB
CHOLEST SERPL-MCNC: 191 MG/DL
CHOLEST/HDLC SERPL: 5.6 (CALC)
ERYTHROCYTE [DISTWIDTH] IN BLOOD BY AUTOMATED COUNT: 13.5 % (ref 11–15)
EST. AVERAGE GLUCOSE BLD GHB EST-MCNC: 108 MG/DL
EST. AVERAGE GLUCOSE BLD GHB EST-SCNC: 6 MMOL/L
HBA1C MFR BLD: 5.4 %
HCT VFR BLD AUTO: 44.2 % (ref 38.5–50)
HDLC SERPL-MCNC: 34 MG/DL
HGB BLD-MCNC: 15.1 G/DL (ref 13.2–17.1)
LDLC SERPL CALC-MCNC: 124 MG/DL (CALC)
MCH RBC QN AUTO: 30.4 PG (ref 27–33)
MCHC RBC AUTO-ENTMCNC: 34.2 G/DL (ref 32–36)
MCV RBC AUTO: 89.1 FL (ref 80–100)
NONHDLC SERPL-MCNC: 157 MG/DL (CALC)
PLATELET # BLD AUTO: 415 THOUSAND/UL (ref 140–400)
PMV BLD REES-ECKER: 10 FL (ref 7.5–12.5)
PSA SERPL-MCNC: 1.01 NG/ML
RBC # BLD AUTO: 4.96 MILLION/UL (ref 4.2–5.8)
TRIGL SERPL-MCNC: 211 MG/DL
WBC # BLD AUTO: 7.8 THOUSAND/UL (ref 3.8–10.8)

## 2025-07-13 DIAGNOSIS — Z00.00 HEALTH CARE MAINTENANCE: ICD-10-CM

## 2025-07-13 RX ORDER — MONTELUKAST SODIUM 10 MG/1
10 TABLET ORAL NIGHTLY
Qty: 90 TABLET | Refills: 1 | Status: SHIPPED | OUTPATIENT
Start: 2025-07-13

## 2025-07-16 DIAGNOSIS — I10 BENIGN ESSENTIAL HYPERTENSION: ICD-10-CM

## 2025-07-16 PROCEDURE — RXMED WILLOW AMBULATORY MEDICATION CHARGE

## 2025-07-18 ENCOUNTER — PHARMACY VISIT (OUTPATIENT)
Dept: PHARMACY | Facility: CLINIC | Age: 55
End: 2025-07-18
Payer: COMMERCIAL

## 2025-07-19 ENCOUNTER — PHARMACY VISIT (OUTPATIENT)
Dept: PHARMACY | Facility: CLINIC | Age: 55
End: 2025-07-19
Payer: COMMERCIAL

## 2025-07-19 PROCEDURE — RXMED WILLOW AMBULATORY MEDICATION CHARGE

## 2025-07-19 RX ORDER — AMLODIPINE BESYLATE 5 MG/1
5 TABLET ORAL DAILY
Qty: 90 TABLET | Refills: 0 | Status: SHIPPED | OUTPATIENT
Start: 2025-07-19 | End: 2026-07-19

## 2025-07-19 RX ORDER — LOSARTAN POTASSIUM AND HYDROCHLOROTHIAZIDE 12.5; 5 MG/1; MG/1
2 TABLET ORAL DAILY
Qty: 180 TABLET | Refills: 0 | Status: SHIPPED | OUTPATIENT
Start: 2025-07-19 | End: 2026-07-19

## 2025-07-28 ENCOUNTER — APPOINTMENT (OUTPATIENT)
Dept: PHARMACY | Facility: HOSPITAL | Age: 55
End: 2025-07-28
Payer: COMMERCIAL

## 2025-07-28 DIAGNOSIS — E11.9 TYPE 2 DIABETES MELLITUS WITHOUT COMPLICATION, WITHOUT LONG-TERM CURRENT USE OF INSULIN: Primary | ICD-10-CM

## 2025-07-28 ASSESSMENT — ENCOUNTER SYMPTOMS: POLYDIPSIA: 0

## 2025-07-28 NOTE — PROGRESS NOTES
[facility] ([facility])    Tevin Costello is a 55 y.o. male was referred to Clinical Pharmacy Team to complete a comprehensive medication review (CMR) with a pharmacist as part of the [employer] diabetes program.  Pharmacy team may also provide assistance in diabetes management per discussion with referring provider and/or endocrinology.    Referring Provider: Morales Clinton MD  Does patient follow with Endocrinology: No  Endocrinology Provider Name: N/A    Subjective   Allergies   Allergen Reactions    Nsaids (Non-Steroidal Anti-Inflammatory Drug) Anaphylaxis    Aspirin Swelling    Bee Pollen Unknown    Milk Containing Products (Dairy) Unknown    Mold Unknown    Penicillin Swelling       [facility]  [address], [address]  [facility] [address]  Phone: [phone] Fax: [phone]    [facility] [address] [place] [facility] [address] [facility]  [address] [facility] [address]  Phone: [phone] Fax: [phone]    [facility] [address] [facility]  [address] [address]  [address]  [facility] [address]  Phone: [phone] Fax: [phone]      Diabetes  He presents for his follow-up diabetic visit. He has type 2 diabetes mellitus. His disease course has been improving. There are no hypoglycemic associated symptoms. Pertinent negatives for diabetes include no polydipsia and no polyuria. There are no hypoglycemic complications. Symptoms are improving. Risk factors for coronary artery disease include diabetes mellitus, male sex and obesity. Current diabetic treatments: Metformin  mg two tabs daily; Mounjaro 15 mg weekly, He is compliant with treatment all of the time. There is no change in his home blood glucose trend. His breakfast blood glucose range is generally  mg/dl. His overall blood glucose range is  mg/dl.       Objective     There were no vitals taken for this visit.     LAB  Lab  "Results   Component Value Date    BILITOT 0.5 02/25/2025    CALCIUM 9.9 02/25/2025    CO2 29 02/25/2025     02/25/2025    CREATININE 0.89 02/25/2025    GLUCOSE 94 02/25/2025    ALKPHOS 70 02/25/2025    K 4.2 02/25/2025    PROT 7.7 02/25/2025     02/25/2025    AST 19 02/25/2025    ALT 19 02/25/2025    BUN 16 02/25/2025    ANIONGAP 8 02/25/2025    MG 2.10 01/11/2021    ALBUMIN 4.6 02/25/2025    LIPASE 31 08/27/2024    GFRMALE >90 01/28/2022     Lab Results   Component Value Date    TRIG 211 (H) 07/10/2025    CHOL 191 07/10/2025    LDLCALC 124 (H) 07/10/2025    HDL 34 (L) 07/10/2025     Lab Results   Component Value Date    HGBA1C 5.4 07/10/2025     Estimated body mass index is 39.75 kg/m² as calculated from the following:    Height as of 10/1/24: 1.803 m (5' 11\").    Weight as of 7/10/25: 129 kg (285 lb).     Current Outpatient Medications on File Prior to Visit   Medication Sig Dispense Refill    amLODIPine (Norvasc) 5 mg tablet TAKE 1 TABLET BY MOUTH ONCE DAILY 90 tablet 0    blood-glucose sensor (FreeStyle Britta 3 Plus Sensor) device Replace every 15 days for testing blood sugars 2 each 11    losartan-hydrochlorothiazide (Hyzaar) 50-12.5 mg tablet TAKE 2 TABLETS BY MOUTH ONCE DAILY 180 tablet 0    metFORMIN XR (Glucophage-XR) 500 mg 24 hr tablet Take 2 tablets (1,000 mg) by mouth once daily with breakfast. Do not crush, chew, or split. 180 tablet 3    methylphenidate ER (Concerta) 36 mg extended release tablet Take 1 tablet by mouth daily 30 tablet 0    methylphenidate ER (Concerta) 36 mg extended release tablet Take 1 tablet by mouth once daily 30 tablet 0    montelukast (Singulair) 10 mg tablet Take 1 tablet (10 mg) by mouth once daily at bedtime. 90 tablet 1    tirzepatide (Mounjaro) 15 mg/0.5 mL pen injector Inject 15 mg under the skin 1 (one) time per week. 6 mL 3     No current facility-administered medications on file prior to visit.        HISTORICAL PHARMACOTHERAPY (if " relevant)  -N/A    Secondary Prevention:  Statin? No  ACE-I/ARB? Yes    Pertinent PMH Review:  PMH of Pancreatitis: No  PMH of Retinopathy: No  PMH of Urinary Tract Infections: No  PMH of MTC: No    SMBG:  - FBG     ASSESSMENT/PLAN:  - Since last visit, patient remains well controlled from a symptom and BG standpoint after continuing Mounjaro 15 mg. His most recent A1C was 5.4% and his weight has continued to decline to 285 lbs. Given progress , we will continue Mounjaro 15 mg.  - Continue Mounjaro 15 mg once weekly.  - Continue Metformin 500 mg ER two tabs daily     Employee Health Diabetes Program (VB[facility])  - Patient enrolled in  Employee diabetes program for $0 co-pays on diabetes medications/supplies. Enrollment should be active in 2-4 weeks.  - Requested [facility] enrollment date: 7/28/25  - PharmD Management Level: 4  -  Pharmacy fill location: [facility]    Assessment/Plan   Problem List Items Addressed This Visit       Type 2 diabetes mellitus without complication, without long-term current use of insulin - Primary    - Continue Mounjaro 15 mg once weekly.  - Continue Metformin 500 mg ER two tabs daily            Follow up: 6 months    Continue all meds under the continuation of care with the referring provider and clinical pharmacy team.    Clay Nair, PharmD     Verbal consent to manage patient's drug therapy was obtained from [the patient and/or an individual authorized to act on behalf of a patient]. They were informed they may decline to participate or withdraw from participation in pharmacy services at any time.

## 2025-08-12 ENCOUNTER — PHARMACY VISIT (OUTPATIENT)
Dept: PHARMACY | Facility: CLINIC | Age: 55
End: 2025-08-12
Payer: COMMERCIAL

## 2025-08-12 PROCEDURE — RXMED WILLOW AMBULATORY MEDICATION CHARGE

## 2025-08-12 RX ORDER — METHYLPHENIDATE HYDROCHLORIDE 36 MG/1
36 TABLET ORAL DAILY
Qty: 30 TABLET | Refills: 0 | OUTPATIENT
Start: 2025-08-12

## 2026-01-26 ENCOUNTER — APPOINTMENT (OUTPATIENT)
Dept: PHARMACY | Facility: HOSPITAL | Age: 56
End: 2026-01-26
Payer: COMMERCIAL

## 2026-02-13 ENCOUNTER — APPOINTMENT (OUTPATIENT)
Dept: OPHTHALMOLOGY | Facility: CLINIC | Age: 56
End: 2026-02-13
Payer: COMMERCIAL

## 2026-02-20 ENCOUNTER — APPOINTMENT (OUTPATIENT)
Dept: OPHTHALMOLOGY | Facility: CLINIC | Age: 56
End: 2026-02-20
Payer: COMMERCIAL